# Patient Record
Sex: MALE | Race: WHITE | NOT HISPANIC OR LATINO | Employment: OTHER | URBAN - METROPOLITAN AREA
[De-identification: names, ages, dates, MRNs, and addresses within clinical notes are randomized per-mention and may not be internally consistent; named-entity substitution may affect disease eponyms.]

---

## 2017-02-12 ENCOUNTER — COMMUNICATION - HEALTHEAST (OUTPATIENT)
Dept: INTERNAL MEDICINE | Facility: CLINIC | Age: 57
End: 2017-02-12

## 2017-02-12 DIAGNOSIS — I10 HTN (HYPERTENSION): ICD-10-CM

## 2017-04-24 ENCOUNTER — OFFICE VISIT - HEALTHEAST (OUTPATIENT)
Dept: INTERNAL MEDICINE | Facility: CLINIC | Age: 57
End: 2017-04-24

## 2017-04-24 DIAGNOSIS — E78.2 MIXED HYPERLIPIDEMIA: ICD-10-CM

## 2017-04-24 DIAGNOSIS — E55.9 VITAMIN D INSUFFICIENCY: ICD-10-CM

## 2017-04-24 DIAGNOSIS — R53.83 FATIGUE: ICD-10-CM

## 2017-04-24 DIAGNOSIS — I10 ESSENTIAL HYPERTENSION, BENIGN: ICD-10-CM

## 2017-04-24 DIAGNOSIS — R73.09 OTHER ABNORMAL GLUCOSE: ICD-10-CM

## 2017-04-24 DIAGNOSIS — Z00.00 ROUTINE GENERAL MEDICAL EXAMINATION AT A HEALTH CARE FACILITY: ICD-10-CM

## 2017-04-24 DIAGNOSIS — R22.41 LEG MASS, RIGHT: ICD-10-CM

## 2017-04-24 DIAGNOSIS — I10 ESSENTIAL HYPERTENSION: ICD-10-CM

## 2017-04-24 DIAGNOSIS — C61 MALIGNANT NEOPLASM OF PROSTATE (H): ICD-10-CM

## 2017-04-24 DIAGNOSIS — G47.33 OBSTRUCTIVE SLEEP APNEA (ADULT) (PEDIATRIC): ICD-10-CM

## 2017-04-24 LAB
CHOLEST SERPL-MCNC: 194 MG/DL
FASTING STATUS PATIENT QL REPORTED: ABNORMAL
HDLC SERPL-MCNC: 40 MG/DL
LDLC SERPL CALC-MCNC: 112 MG/DL
PSA SERPL-MCNC: <0.1 NG/ML (ref 0–3.5)
TRIGL SERPL-MCNC: 209 MG/DL

## 2017-04-24 ASSESSMENT — MIFFLIN-ST. JEOR: SCORE: 1778.54

## 2017-04-25 ENCOUNTER — HOSPITAL ENCOUNTER (OUTPATIENT)
Dept: ULTRASOUND IMAGING | Facility: CLINIC | Age: 57
Discharge: HOME | End: 2017-04-25
Attending: INTERNAL MEDICINE

## 2017-04-25 DIAGNOSIS — R22.41 LEG MASS, RIGHT: ICD-10-CM

## 2017-04-26 ENCOUNTER — COMMUNICATION - HEALTHEAST (OUTPATIENT)
Dept: INTERNAL MEDICINE | Facility: CLINIC | Age: 57
End: 2017-04-26

## 2017-06-14 ENCOUNTER — COMMUNICATION - HEALTHEAST (OUTPATIENT)
Dept: INTERNAL MEDICINE | Facility: CLINIC | Age: 57
End: 2017-06-14

## 2017-06-14 ENCOUNTER — RECORDS - HEALTHEAST (OUTPATIENT)
Dept: ADMINISTRATIVE | Facility: OTHER | Age: 57
End: 2017-06-14

## 2018-02-01 ENCOUNTER — COMMUNICATION - HEALTHEAST (OUTPATIENT)
Dept: INTERNAL MEDICINE | Facility: CLINIC | Age: 58
End: 2018-02-01

## 2018-02-01 DIAGNOSIS — I10 ESSENTIAL HYPERTENSION: ICD-10-CM

## 2018-03-26 ENCOUNTER — RECORDS - HEALTHEAST (OUTPATIENT)
Dept: ADMINISTRATIVE | Facility: OTHER | Age: 58
End: 2018-03-26

## 2018-04-27 ENCOUNTER — COMMUNICATION - HEALTHEAST (OUTPATIENT)
Dept: INTERNAL MEDICINE | Facility: CLINIC | Age: 58
End: 2018-04-27

## 2018-04-30 ENCOUNTER — OFFICE VISIT - HEALTHEAST (OUTPATIENT)
Dept: INTERNAL MEDICINE | Facility: CLINIC | Age: 58
End: 2018-04-30

## 2018-04-30 DIAGNOSIS — E78.2 MIXED HYPERLIPIDEMIA: ICD-10-CM

## 2018-04-30 DIAGNOSIS — Z00.00 ROUTINE GENERAL MEDICAL EXAMINATION AT A HEALTH CARE FACILITY: ICD-10-CM

## 2018-04-30 DIAGNOSIS — I10 ESSENTIAL HYPERTENSION, BENIGN: ICD-10-CM

## 2018-04-30 DIAGNOSIS — C61 MALIGNANT NEOPLASM OF PROSTATE (H): ICD-10-CM

## 2018-04-30 DIAGNOSIS — F52.8 PSYCHOSEXUAL DYSFUNCTION WITH INHIBITED SEXUAL EXCITEMENT: ICD-10-CM

## 2018-04-30 DIAGNOSIS — G47.33 OBSTRUCTIVE SLEEP APNEA: ICD-10-CM

## 2018-04-30 LAB
ALBUMIN SERPL-MCNC: 4.3 G/DL (ref 3.5–5)
ALBUMIN UR-MCNC: NEGATIVE MG/DL
ALP SERPL-CCNC: 48 U/L (ref 45–120)
ALT SERPL W P-5'-P-CCNC: 43 U/L (ref 0–45)
ANION GAP SERPL CALCULATED.3IONS-SCNC: 12 MMOL/L (ref 5–18)
APPEARANCE UR: CLEAR
AST SERPL W P-5'-P-CCNC: 18 U/L (ref 0–40)
BILIRUB SERPL-MCNC: 1.5 MG/DL (ref 0–1)
BILIRUB UR QL STRIP: NEGATIVE
BUN SERPL-MCNC: 14 MG/DL (ref 8–22)
CALCIUM SERPL-MCNC: 9.9 MG/DL (ref 8.5–10.5)
CHLORIDE BLD-SCNC: 106 MMOL/L (ref 98–107)
CHOLEST SERPL-MCNC: 187 MG/DL
CO2 SERPL-SCNC: 22 MMOL/L (ref 22–31)
COLOR UR AUTO: YELLOW
CREAT SERPL-MCNC: 0.79 MG/DL (ref 0.7–1.3)
ERYTHROCYTE [DISTWIDTH] IN BLOOD BY AUTOMATED COUNT: 12.8 % (ref 11–14.5)
FASTING STATUS PATIENT QL REPORTED: YES
GFR SERPL CREATININE-BSD FRML MDRD: >60 ML/MIN/1.73M2
GLUCOSE BLD-MCNC: 91 MG/DL (ref 70–125)
GLUCOSE UR STRIP-MCNC: NEGATIVE MG/DL
HCT VFR BLD AUTO: 47.7 % (ref 40–54)
HDLC SERPL-MCNC: 40 MG/DL
HGB BLD-MCNC: 16.1 G/DL (ref 14–18)
HGB UR QL STRIP: NEGATIVE
KETONES UR STRIP-MCNC: NEGATIVE MG/DL
LDLC SERPL CALC-MCNC: 117 MG/DL
LEUKOCYTE ESTERASE UR QL STRIP: NEGATIVE
MCH RBC QN AUTO: 31.2 PG (ref 27–34)
MCHC RBC AUTO-ENTMCNC: 33.8 G/DL (ref 32–36)
MCV RBC AUTO: 92 FL (ref 80–100)
NITRATE UR QL: NEGATIVE
PH UR STRIP: 5 [PH] (ref 5–8)
PLATELET # BLD AUTO: 192 THOU/UL (ref 140–440)
PMV BLD AUTO: 7.4 FL (ref 7–10)
POTASSIUM BLD-SCNC: 3.5 MMOL/L (ref 3.5–5)
PROT SERPL-MCNC: 7.7 G/DL (ref 6–8)
PSA SERPL-MCNC: <0.1 NG/ML (ref 0–3.5)
RBC # BLD AUTO: 5.17 MILL/UL (ref 4.4–6.2)
SODIUM SERPL-SCNC: 140 MMOL/L (ref 136–145)
SP GR UR STRIP: 1.01 (ref 1–1.03)
TRIGL SERPL-MCNC: 150 MG/DL
UROBILINOGEN UR STRIP-ACNC: NORMAL
WBC: 6.9 THOU/UL (ref 4–11)

## 2018-04-30 ASSESSMENT — MIFFLIN-ST. JEOR: SCORE: 1792.15

## 2018-05-02 ENCOUNTER — COMMUNICATION - HEALTHEAST (OUTPATIENT)
Dept: INTERNAL MEDICINE | Facility: CLINIC | Age: 58
End: 2018-05-02

## 2018-05-24 ENCOUNTER — COMMUNICATION - HEALTHEAST (OUTPATIENT)
Dept: INTERNAL MEDICINE | Facility: CLINIC | Age: 58
End: 2018-05-24

## 2018-05-24 DIAGNOSIS — N52.9 ED (ERECTILE DYSFUNCTION): ICD-10-CM

## 2018-05-24 DIAGNOSIS — I10 ESSENTIAL HYPERTENSION: ICD-10-CM

## 2019-02-25 ENCOUNTER — RECORDS - HEALTHEAST (OUTPATIENT)
Dept: ADMINISTRATIVE | Facility: OTHER | Age: 59
End: 2019-02-25

## 2019-02-25 ENCOUNTER — OFFICE VISIT - HEALTHEAST (OUTPATIENT)
Dept: INTERNAL MEDICINE | Facility: CLINIC | Age: 59
End: 2019-02-25

## 2019-02-25 DIAGNOSIS — I10 ESSENTIAL HYPERTENSION, BENIGN: ICD-10-CM

## 2019-02-25 DIAGNOSIS — C61 MALIGNANT NEOPLASM OF PROSTATE (H): ICD-10-CM

## 2019-02-25 DIAGNOSIS — Z76.89 SLEEP CONCERN: ICD-10-CM

## 2019-02-25 DIAGNOSIS — Z00.00 ROUTINE GENERAL MEDICAL EXAMINATION AT A HEALTH CARE FACILITY: ICD-10-CM

## 2019-02-25 DIAGNOSIS — G47.33 OBSTRUCTIVE SLEEP APNEA: ICD-10-CM

## 2019-02-25 DIAGNOSIS — E78.2 MIXED HYPERLIPIDEMIA: ICD-10-CM

## 2019-02-25 DIAGNOSIS — R73.09 OTHER ABNORMAL GLUCOSE: ICD-10-CM

## 2019-02-25 LAB
ALBUMIN SERPL-MCNC: 4.6 G/DL (ref 3.5–5)
ALBUMIN UR-MCNC: ABNORMAL MG/DL
ALP SERPL-CCNC: 50 U/L (ref 45–120)
ALT SERPL W P-5'-P-CCNC: 37 U/L (ref 0–45)
ANION GAP SERPL CALCULATED.3IONS-SCNC: 11 MMOL/L (ref 5–18)
APPEARANCE UR: CLEAR
AST SERPL W P-5'-P-CCNC: 24 U/L (ref 0–40)
BACTERIA #/AREA URNS HPF: ABNORMAL HPF
BILIRUB SERPL-MCNC: 1.6 MG/DL (ref 0–1)
BILIRUB UR QL STRIP: NEGATIVE
BUN SERPL-MCNC: 15 MG/DL (ref 8–22)
CALCIUM SERPL-MCNC: 9.9 MG/DL (ref 8.5–10.5)
CHLORIDE BLD-SCNC: 106 MMOL/L (ref 98–107)
CHOLEST SERPL-MCNC: 203 MG/DL
CO2 SERPL-SCNC: 24 MMOL/L (ref 22–31)
COLOR UR AUTO: YELLOW
CREAT SERPL-MCNC: 0.95 MG/DL (ref 0.7–1.3)
ERYTHROCYTE [DISTWIDTH] IN BLOOD BY AUTOMATED COUNT: 13.1 % (ref 11–14.5)
FASTING STATUS PATIENT QL REPORTED: ABNORMAL
GFR SERPL CREATININE-BSD FRML MDRD: >60 ML/MIN/1.73M2
GLUCOSE BLD-MCNC: 86 MG/DL (ref 70–125)
GLUCOSE UR STRIP-MCNC: NEGATIVE MG/DL
HCT VFR BLD AUTO: 45.5 % (ref 40–54)
HDLC SERPL-MCNC: 47 MG/DL
HGB BLD-MCNC: 15.8 G/DL (ref 14–18)
HGB UR QL STRIP: NEGATIVE
HYALINE CASTS #/AREA URNS LPF: ABNORMAL LPF
KETONES UR STRIP-MCNC: NEGATIVE MG/DL
LDLC SERPL CALC-MCNC: 113 MG/DL
LEUKOCYTE ESTERASE UR QL STRIP: NEGATIVE
MCH RBC QN AUTO: 31.2 PG (ref 27–34)
MCHC RBC AUTO-ENTMCNC: 34.8 G/DL (ref 32–36)
MCV RBC AUTO: 90 FL (ref 80–100)
MUCOUS THREADS #/AREA URNS LPF: ABNORMAL LPF
NITRATE UR QL: NEGATIVE
PH UR STRIP: 5 [PH] (ref 5–8)
PLATELET # BLD AUTO: 194 THOU/UL (ref 140–440)
PMV BLD AUTO: 7.4 FL (ref 7–10)
POTASSIUM BLD-SCNC: 3.4 MMOL/L (ref 3.5–5)
PROT SERPL-MCNC: 7.8 G/DL (ref 6–8)
PSA SERPL-MCNC: <0.1 NG/ML (ref 0–3.5)
RBC # BLD AUTO: 5.08 MILL/UL (ref 4.4–6.2)
RBC #/AREA URNS AUTO: ABNORMAL HPF
SODIUM SERPL-SCNC: 141 MMOL/L (ref 136–145)
SP GR UR STRIP: >=1.03 (ref 1–1.03)
SQUAMOUS #/AREA URNS AUTO: ABNORMAL LPF
TRIGL SERPL-MCNC: 216 MG/DL
UROBILINOGEN UR STRIP-ACNC: ABNORMAL
WBC #/AREA URNS AUTO: ABNORMAL HPF
WBC: 8.3 THOU/UL (ref 4–11)

## 2019-02-25 ASSESSMENT — MIFFLIN-ST. JEOR: SCORE: 1774.01

## 2019-02-27 ENCOUNTER — COMMUNICATION - HEALTHEAST (OUTPATIENT)
Dept: INTERNAL MEDICINE | Facility: CLINIC | Age: 59
End: 2019-02-27

## 2019-02-27 DIAGNOSIS — E87.6 HYPOKALEMIA: ICD-10-CM

## 2019-02-28 ENCOUNTER — COMMUNICATION - HEALTHEAST (OUTPATIENT)
Dept: INTERNAL MEDICINE | Facility: CLINIC | Age: 59
End: 2019-02-28

## 2019-03-15 ENCOUNTER — COMMUNICATION - HEALTHEAST (OUTPATIENT)
Dept: INTERNAL MEDICINE | Facility: CLINIC | Age: 59
End: 2019-03-15

## 2019-04-02 ENCOUNTER — RECORDS - HEALTHEAST (OUTPATIENT)
Dept: ADMINISTRATIVE | Facility: OTHER | Age: 59
End: 2019-04-02

## 2019-06-04 ENCOUNTER — COMMUNICATION - HEALTHEAST (OUTPATIENT)
Dept: INTERNAL MEDICINE | Facility: CLINIC | Age: 59
End: 2019-06-04

## 2019-06-04 DIAGNOSIS — I10 ESSENTIAL HYPERTENSION: ICD-10-CM

## 2020-01-06 ENCOUNTER — COMMUNICATION - HEALTHEAST (OUTPATIENT)
Dept: SCHEDULING | Facility: CLINIC | Age: 60
End: 2020-01-06

## 2020-01-07 ENCOUNTER — COMMUNICATION - HEALTHEAST (OUTPATIENT)
Dept: INTERNAL MEDICINE | Facility: CLINIC | Age: 60
End: 2020-01-07

## 2020-01-07 DIAGNOSIS — I10 ESSENTIAL HYPERTENSION, BENIGN: ICD-10-CM

## 2020-02-26 ENCOUNTER — OFFICE VISIT - HEALTHEAST (OUTPATIENT)
Dept: INTERNAL MEDICINE | Facility: CLINIC | Age: 60
End: 2020-02-26

## 2020-02-26 DIAGNOSIS — E55.9 VITAMIN D INSUFFICIENCY: ICD-10-CM

## 2020-02-26 DIAGNOSIS — Z00.00 ROUTINE GENERAL MEDICAL EXAMINATION AT A HEALTH CARE FACILITY: ICD-10-CM

## 2020-02-26 DIAGNOSIS — E87.6 HYPOKALEMIA: ICD-10-CM

## 2020-02-26 DIAGNOSIS — G47.33 OBSTRUCTIVE SLEEP APNEA: ICD-10-CM

## 2020-02-26 DIAGNOSIS — I10 ESSENTIAL HYPERTENSION, BENIGN: ICD-10-CM

## 2020-02-26 DIAGNOSIS — R80.9 PROTEINURIA, UNSPECIFIED TYPE: ICD-10-CM

## 2020-02-26 DIAGNOSIS — R73.09 OTHER ABNORMAL GLUCOSE: ICD-10-CM

## 2020-02-26 DIAGNOSIS — C61 MALIGNANT NEOPLASM OF PROSTATE (H): ICD-10-CM

## 2020-02-26 DIAGNOSIS — E78.2 MIXED HYPERLIPIDEMIA: ICD-10-CM

## 2020-02-26 LAB
ALBUMIN SERPL-MCNC: 4.6 G/DL (ref 3.5–5)
ALBUMIN UR-MCNC: NEGATIVE MG/DL
ALP SERPL-CCNC: 46 U/L (ref 45–120)
ALT SERPL W P-5'-P-CCNC: 35 U/L (ref 0–45)
ANION GAP SERPL CALCULATED.3IONS-SCNC: 11 MMOL/L (ref 5–18)
APPEARANCE UR: CLEAR
AST SERPL W P-5'-P-CCNC: 24 U/L (ref 0–40)
BILIRUB SERPL-MCNC: 1.7 MG/DL (ref 0–1)
BILIRUB UR QL STRIP: NEGATIVE
BUN SERPL-MCNC: 15 MG/DL (ref 8–22)
CALCIUM SERPL-MCNC: 10.1 MG/DL (ref 8.5–10.5)
CHLORIDE BLD-SCNC: 104 MMOL/L (ref 98–107)
CHOLEST SERPL-MCNC: 210 MG/DL
CO2 SERPL-SCNC: 27 MMOL/L (ref 22–31)
COLOR UR AUTO: YELLOW
CREAT SERPL-MCNC: 0.91 MG/DL (ref 0.7–1.3)
ERYTHROCYTE [DISTWIDTH] IN BLOOD BY AUTOMATED COUNT: 12.3 % (ref 11–14.5)
FASTING STATUS PATIENT QL REPORTED: YES
GFR SERPL CREATININE-BSD FRML MDRD: >60 ML/MIN/1.73M2
GLUCOSE BLD-MCNC: 107 MG/DL (ref 70–125)
GLUCOSE UR STRIP-MCNC: NEGATIVE MG/DL
HCT VFR BLD AUTO: 47.4 % (ref 40–54)
HDLC SERPL-MCNC: 43 MG/DL
HGB BLD-MCNC: 16.4 G/DL (ref 14–18)
HGB UR QL STRIP: NEGATIVE
KETONES UR STRIP-MCNC: NEGATIVE MG/DL
LDLC SERPL CALC-MCNC: 113 MG/DL
LEUKOCYTE ESTERASE UR QL STRIP: NEGATIVE
MCH RBC QN AUTO: 31.8 PG (ref 27–34)
MCHC RBC AUTO-ENTMCNC: 34.6 G/DL (ref 32–36)
MCV RBC AUTO: 92 FL (ref 80–100)
NITRATE UR QL: NEGATIVE
PH UR STRIP: 5.5 [PH] (ref 5–8)
PLATELET # BLD AUTO: 201 THOU/UL (ref 140–440)
PMV BLD AUTO: 7.3 FL (ref 7–10)
POTASSIUM BLD-SCNC: 3.8 MMOL/L (ref 3.5–5)
PROT SERPL-MCNC: 7.8 G/DL (ref 6–8)
PSA SERPL-MCNC: <0.1 NG/ML (ref 0–4.5)
RBC # BLD AUTO: 5.16 MILL/UL (ref 4.4–6.2)
SODIUM SERPL-SCNC: 142 MMOL/L (ref 136–145)
SP GR UR STRIP: 1.02 (ref 1–1.03)
TRIGL SERPL-MCNC: 271 MG/DL
UROBILINOGEN UR STRIP-ACNC: NORMAL
WBC: 7.7 THOU/UL (ref 4–11)

## 2020-02-26 ASSESSMENT — MIFFLIN-ST. JEOR: SCORE: 1796.69

## 2020-02-27 ENCOUNTER — COMMUNICATION - HEALTHEAST (OUTPATIENT)
Dept: INTERNAL MEDICINE | Facility: CLINIC | Age: 60
End: 2020-02-27

## 2020-03-18 ENCOUNTER — COMMUNICATION - HEALTHEAST (OUTPATIENT)
Dept: INTERNAL MEDICINE | Facility: CLINIC | Age: 60
End: 2020-03-18

## 2020-03-18 DIAGNOSIS — I10 ESSENTIAL HYPERTENSION: ICD-10-CM

## 2020-04-29 ENCOUNTER — COMMUNICATION - HEALTHEAST (OUTPATIENT)
Dept: INTERNAL MEDICINE | Facility: CLINIC | Age: 60
End: 2020-04-29

## 2020-04-29 DIAGNOSIS — I10 ESSENTIAL HYPERTENSION, BENIGN: ICD-10-CM

## 2020-11-02 ENCOUNTER — COMMUNICATION - HEALTHEAST (OUTPATIENT)
Dept: INTERNAL MEDICINE | Facility: CLINIC | Age: 60
End: 2020-11-02

## 2020-11-02 DIAGNOSIS — I10 ESSENTIAL HYPERTENSION, BENIGN: ICD-10-CM

## 2021-02-01 ENCOUNTER — COMMUNICATION - HEALTHEAST (OUTPATIENT)
Dept: INTERNAL MEDICINE | Facility: CLINIC | Age: 61
End: 2021-02-01

## 2021-02-01 DIAGNOSIS — I10 ESSENTIAL HYPERTENSION, BENIGN: ICD-10-CM

## 2021-02-02 ENCOUNTER — OFFICE VISIT - HEALTHEAST (OUTPATIENT)
Dept: INTERNAL MEDICINE | Facility: CLINIC | Age: 61
End: 2021-02-02

## 2021-02-02 DIAGNOSIS — C61 MALIGNANT NEOPLASM OF PROSTATE (H): ICD-10-CM

## 2021-02-02 DIAGNOSIS — E78.2 MIXED HYPERLIPIDEMIA: ICD-10-CM

## 2021-02-02 DIAGNOSIS — G47.33 OBSTRUCTIVE SLEEP APNEA: ICD-10-CM

## 2021-02-02 DIAGNOSIS — F52.8 PSYCHOSEXUAL DYSFUNCTION WITH INHIBITED SEXUAL EXCITEMENT: ICD-10-CM

## 2021-02-02 DIAGNOSIS — I10 ESSENTIAL HYPERTENSION, BENIGN: ICD-10-CM

## 2021-02-02 DIAGNOSIS — R73.09 OTHER ABNORMAL GLUCOSE: ICD-10-CM

## 2021-02-02 DIAGNOSIS — Z00.00 ROUTINE GENERAL MEDICAL EXAMINATION AT A HEALTH CARE FACILITY: ICD-10-CM

## 2021-02-02 DIAGNOSIS — G47.52 REM SLEEP BEHAVIOR DISORDER: ICD-10-CM

## 2021-02-02 LAB
ALBUMIN SERPL-MCNC: 4.5 G/DL (ref 3.5–5)
ALBUMIN UR-MCNC: NEGATIVE MG/DL
ALP SERPL-CCNC: 41 U/L (ref 45–120)
ALT SERPL W P-5'-P-CCNC: 25 U/L (ref 0–45)
ANION GAP SERPL CALCULATED.3IONS-SCNC: 12 MMOL/L (ref 5–18)
APPEARANCE UR: CLEAR
AST SERPL W P-5'-P-CCNC: 19 U/L (ref 0–40)
BACTERIA #/AREA URNS HPF: ABNORMAL HPF
BILIRUB SERPL-MCNC: 1.9 MG/DL (ref 0–1)
BILIRUB UR QL STRIP: NEGATIVE
BUN SERPL-MCNC: 13 MG/DL (ref 8–22)
CALCIUM SERPL-MCNC: 9.5 MG/DL (ref 8.5–10.5)
CHLORIDE BLD-SCNC: 104 MMOL/L (ref 98–107)
CHOLEST SERPL-MCNC: 201 MG/DL
CO2 SERPL-SCNC: 24 MMOL/L (ref 22–31)
COLOR UR AUTO: YELLOW
CREAT SERPL-MCNC: 0.93 MG/DL (ref 0.7–1.3)
ERYTHROCYTE [DISTWIDTH] IN BLOOD BY AUTOMATED COUNT: 12.3 % (ref 11–14.5)
FASTING STATUS PATIENT QL REPORTED: YES
GFR SERPL CREATININE-BSD FRML MDRD: >60 ML/MIN/1.73M2
GLUCOSE BLD-MCNC: 97 MG/DL (ref 70–125)
GLUCOSE UR STRIP-MCNC: NEGATIVE MG/DL
HBA1C MFR BLD: 5.3 %
HCT VFR BLD AUTO: 41.9 % (ref 40–54)
HDLC SERPL-MCNC: 43 MG/DL
HGB BLD-MCNC: 14.6 G/DL (ref 14–18)
HGB UR QL STRIP: ABNORMAL
KETONES UR STRIP-MCNC: NEGATIVE MG/DL
LDLC SERPL CALC-MCNC: 133 MG/DL
LEUKOCYTE ESTERASE UR QL STRIP: NEGATIVE
MCH RBC QN AUTO: 30.4 PG (ref 27–34)
MCHC RBC AUTO-ENTMCNC: 34.8 G/DL (ref 32–36)
MCV RBC AUTO: 87 FL (ref 80–100)
NITRATE UR QL: NEGATIVE
PH UR STRIP: 5.5 [PH] (ref 5–8)
PLATELET # BLD AUTO: 174 THOU/UL (ref 140–440)
PMV BLD AUTO: 9 FL (ref 7–10)
POTASSIUM BLD-SCNC: 3.5 MMOL/L (ref 3.5–5)
PROT SERPL-MCNC: 7.2 G/DL (ref 6–8)
PSA SERPL-MCNC: <0.1 NG/ML (ref 0–4.5)
RBC # BLD AUTO: 4.8 MILL/UL (ref 4.4–6.2)
RBC #/AREA URNS AUTO: ABNORMAL HPF
SODIUM SERPL-SCNC: 140 MMOL/L (ref 136–145)
SP GR UR STRIP: >=1.03 (ref 1–1.03)
SQUAMOUS #/AREA URNS AUTO: ABNORMAL LPF
TRIGL SERPL-MCNC: 125 MG/DL
UROBILINOGEN UR STRIP-ACNC: ABNORMAL
WBC #/AREA URNS AUTO: ABNORMAL HPF
WBC: 5.5 THOU/UL (ref 4–11)

## 2021-02-02 ASSESSMENT — MIFFLIN-ST. JEOR: SCORE: 1764.93

## 2021-02-04 ENCOUNTER — COMMUNICATION - HEALTHEAST (OUTPATIENT)
Dept: INTERNAL MEDICINE | Facility: CLINIC | Age: 61
End: 2021-02-04

## 2021-05-29 NOTE — TELEPHONE ENCOUNTER
Refill Approved    Rx renewed per Medication Renewal Policy. Medication was last renewed on 5/24/18    Kalie Alvarez, [facility] Triage/Med Refill 6/5/2019     Requested Prescriptions   Pending Prescriptions Disp Refills     losartan-hydrochlorothiazide (HYZAAR) 100-12.5 mg per tablet [Pharmacy Med Name: LOSARTAN POT-HCTZ 100-12.5 TABS] 90 tablet 3     Sig: TAKE ONE TABLET BY MOUTH EVERY DAY       Diuretics/Combination Diuretics Refill Protocol  Passed - 6/4/2019  2:38 PM        Passed - Visit with PCP or prescribing provider visit in past 12 months     Last office visit with prescriber/PCP: 6/16/2016 Joseluis Chappell MD OR same dept: Visit date not found OR same specialty: 6/16/2016 Joseluis Chappell MD  Last physical: 2/25/2019 Last MTM visit: Visit date not found   Next visit within 3 mo: Visit date not found  Next physical within 3 mo: Visit date not found  Prescriber OR PCP: Joseluis Chappell MD  Last diagnosis associated with med order: 1. Essential hypertension  - losartan-hydrochlorothiazide (HYZAAR) 100-12.5 mg per tablet [Pharmacy Med Name: LOSARTAN POT-HCTZ 100-12.5 TABS]; TAKE ONE TABLET BY MOUTH EVERY DAY  Dispense: 90 tablet; Refill: 3    If protocol passes may refill for 12 months if within 3 months of last provider visit (or a total of 15 months).             Passed - Serum Potassium in past 12 months      Lab Results   Component Value Date    Potassium 3.4 (L) 02/25/2019             Passed - Serum Sodium in past 12 months      Lab Results   Component Value Date    Sodium 141 02/25/2019             Passed - Blood pressure on file in past 12 months     BP Readings from Last 1 Encounters:   02/25/19 112/72             Passed - Serum Creatinine in past 12 months      Creatinine   Date Value Ref Range Status   02/25/2019 0.95 0.70 - 1.30 mg/dL Final

## 2021-05-30 ENCOUNTER — RECORDS - HEALTHEAST (OUTPATIENT)
Dept: ADMINISTRATIVE | Facility: CLINIC | Age: 61
End: 2021-05-30

## 2021-05-30 VITALS — HEIGHT: 72 IN | WEIGHT: 208 LBS | BODY MASS INDEX: 28.17 KG/M2

## 2021-06-01 ENCOUNTER — COMMUNICATION - HEALTHEAST (OUTPATIENT)
Dept: INTERNAL MEDICINE | Facility: CLINIC | Age: 61
End: 2021-06-01

## 2021-06-01 VITALS — HEIGHT: 72 IN | WEIGHT: 211 LBS | BODY MASS INDEX: 28.58 KG/M2

## 2021-06-01 DIAGNOSIS — I10 ESSENTIAL HYPERTENSION, BENIGN: ICD-10-CM

## 2021-06-02 VITALS — HEIGHT: 72 IN | BODY MASS INDEX: 28.04 KG/M2 | WEIGHT: 207 LBS

## 2021-06-04 VITALS
BODY MASS INDEX: 28.71 KG/M2 | DIASTOLIC BLOOD PRESSURE: 66 MMHG | HEIGHT: 72 IN | WEIGHT: 212 LBS | SYSTOLIC BLOOD PRESSURE: 122 MMHG | OXYGEN SATURATION: 97 % | HEART RATE: 54 BPM

## 2021-06-05 VITALS
WEIGHT: 205 LBS | HEIGHT: 72 IN | SYSTOLIC BLOOD PRESSURE: 130 MMHG | OXYGEN SATURATION: 98 % | TEMPERATURE: 98.6 F | BODY MASS INDEX: 27.77 KG/M2 | DIASTOLIC BLOOD PRESSURE: 82 MMHG | HEART RATE: 83 BPM

## 2021-06-05 NOTE — TELEPHONE ENCOUNTER
Drug Change Request  Who is calling?:  shelly salgado  What is the current medication?:  hyzaar  What alternative is being requested?: please send 2 rew rx for separate medications   Why the request to change?:  Back order  Requested Pharmacy?: [facility]  Okay to leave a detailed message?:  Yes

## 2021-06-05 NOTE — TELEPHONE ENCOUNTER
Left message for the patient relaying message below from Dr. Chappell regarding separate prescriptions that were requested by the pharmacy.  Asked that the patient call with any further questions.  Kristi SANCHEZ CMA/VINCENT....................8:07 AM

## 2021-06-05 NOTE — TELEPHONE ENCOUNTER
Dr. Chappell,  Raghu for separate refills as requested by the pharmacy due to the nationwide back order of Hyzaar?  Prescriptions have been set up for you to review.  Kristi SANCHEZ CMA/VINCENT....................7:42 AM

## 2021-06-06 NOTE — TELEPHONE ENCOUNTER
----- Message from Joseluis Chappell MD sent at 2/26/2020  6:11 PM CST -----  Please call pt and send letter:    Abdi, your labs here look pretty good except for the cholesterol.  I think it is time we get you on something to prevent a heart attack or stroke.  Your 10-year risk of having a cardiac event is now over 10%.  I would recommend we start just a low dose of generic Crestor.  This is very well-tolerated.  Would you be willing to do that?    Shira let me know what he says.

## 2021-06-06 NOTE — TELEPHONE ENCOUNTER
"Patient Returning Call  Reason for call:  Patient returning phone call  Information relayed to patient:  Message below. After asking the patient would he be willing to start the Crestor he then stated that he wanted to talk to Dr. Chappell first \" so have him call me\"  Patient has additional questions:  Yes  If YES, what are your questions/concerns:  Want to speak to the dr directly  Okay to leave a detailed message?: Yes      "

## 2021-06-06 NOTE — PROGRESS NOTES
Office Visit - Physical   Nathanael Walker   59 y.o.  male    Date of visit: 2/26/2020  Physician: Joseluis Chappell MD     Assessment and Plan   1. Routine general medical examination at a health care facility  He lives a pretty healthy and active lifestyle.  Continue same.  Labs as below.  Urged yearly flu shots.  I have urged him to get his shingles vaccination through his pharmacy.  - Comprehensive Metabolic Panel  - Urinalysis-UC if Indicated  - Lipid Cascade  - HM2(CBC w/o Differential)  - PSA, Diagnostic (Prostatic-Specific Antigen)    2. Benign Essential Hypertension  Excellent control.  Continue same.    3. Hyperglycemia  Recheck blood sugar today.    4. Mixed hyperlipidemia  Lipids today for monitoring.  - Lipid Cascade    5. Adenocarcinoma Of The Prostate Gland  He is 10 years out from his prostate cancer surgery.  Check PSA diagnostic today.  - PSA, Diagnostic (Prostatic-Specific Antigen)    6. Obstructive sleep apnea  He is compliant with CPAP.  Return for to sleep medicine to get new supplies at [facility].  - HM2(CBC w/o Differential)  - Ambulatory referral to Sleep Medicine    7. Proteinuria, unspecified type    - Comprehensive Metabolic Panel  - Urinalysis-UC if Indicated    8. Vitamin D insufficiency  Continue with over-the-counter vitamin D supplementation.    9. Hypokalemia  He did not return for repeat potassium after last visit.  Recheck today.        Return in about 1 year (around 2/26/2021) for Annual physical.     Chief Complaint   Chief Complaint   Patient presents with     Annual Exam     fasting today         Patient Profile   Social History     Social History Narrative     Not on file        Past Medical History   Patient Active Problem List   Diagnosis     Male Erectile Disorder     Proteinuria     Adenocarcinoma Of The Prostate Gland     Mixed hyperlipidemia     Obstructive sleep apnea     Benign Essential Hypertension     Hyperglycemia     Vitamin D insufficiency       Past Surgical  History  He has no past surgical history on file.     History of Present Illness   This 59 y.o. old Jacek comes in a for physical.  Reports his been doing well.  Remains active with regular exercise.  Also he has been golfing down in [place].  He is planning a trip to the [place] his wife next week.  They will be down there for a couple weeks.  He is then going to go to the [employer] in April.  Looking forward to that.  Also [place].  No new family history.  He is estranged from one sister.  His other sister is doing well.  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  He does not know if he will continue to work beyond that.    Non-smoker and social drinker.    He refuses to get a flu shot.  He has not received the shingles vaccination yet.  He does not know if his insurance will cover it.  He has a high deductible plan.    He has sleep apnea and is very compliant with his CPAP.  He needs a new machine.  We set him up with sleep medicine last year but he did not go to that or set that up.  He did not want to spend the money.  He has a high deductible plan.    Review of Systems: A comprehensive review of systems was negative except as noted.     Medications and Allergies   Current Outpatient Medications   Medication Sig Dispense Refill     aspirin 81 MG EC tablet Take 81 mg by mouth daily.       cholecalciferol, vitamin D3, (VITAMIN D3) 2,000 unit cap Take by mouth.       hydroCHLOROthiazide (HYDRODIURIL) 12.5 MG tablet Take 1 tablet (12.5 mg total) by mouth daily. 90 tablet 1     losartan (COZAAR) 100 MG tablet Take 1 tablet (100 mg total) by mouth daily. 90 tablet 1     sildenafil, antihypertensive, (REVATIO) 20 mg tablet 1-5 tablets daily as needed 15 tablet 5     No current facility-administered medications for this visit.      No Known Allergies  "    Family and Social History   No family history on file.     Social History     Tobacco Use     Smoking status: Never Smoker     Smokeless tobacco: Never Used   Substance Use Topics     Alcohol use: Yes     Alcohol/week: 1.7 - 2.5 standard drinks     Types: 2 - 3 drink(s) per week     Drug use: No        Physical Exam   General Appearance:   Pleasant gentleman who appears younger than his age.    /66 (Patient Site: Right Arm, Patient Position: Sitting, Cuff Size: Adult Regular)   Pulse (!) 54   Ht 5' 11.5\" (1.816 m)   Wt 212 lb (96.2 kg)   SpO2 97%   BMI 29.16 kg/m      EYES: Eyelids, conjunctiva, and sclera were normal. Pupils were normal. Cornea, iris, and lens were normal bilaterally.  HEAD, EARS, NOSE, MOUTH, AND THROAT: Head and face were normal. Hearing was normal to voice and the ears were normal to external exam. Nose appearance was normal and there was no discharge. Oropharynx was normal.  NECK: Neck appearance was normal. There were no neck masses and the thyroid was not enlarged.  RESPIRATORY: Breathing pattern was normal and the chest moved symmetrically.  Percussion/auscultatory percussion was normal.  Lung sounds were normal and there were no abnormal sounds.  CARDIOVASCULAR: Heart rate and rhythm were normal.  S1 and S2 were normal and there were no extra sounds or murmurs. Peripheral pulses in arms and legs were normal.  Jugular venous pressure was normal.  There was no peripheral edema.  GASTROINTESTINAL: The abdomen was normal in contour.  Bowel sounds were present.  Percussion detected no organ enlargement or tenderness.  Palpation detected no tenderness, mass, or enlarged organs.   MUSCULOSKELETAL: Skeletal configuration was normal and muscle mass was normal for age. Joint appearance was overall normal.  LYMPHATIC: There were no enlarged nodes.  SKIN/HAIR/NAILS: Skin color was normal.  There were no skin lesions.  Hair and nails were normal.  NEUROLOGIC: The patient was alert and " oriented to person, place, time, and circumstance. Speech was normal. Cranial nerves were normal. Motor strength was normal for age. The patient was normally coordinated.  PSYCHIATRIC:  Mood and affect were normal and the patient had normal recent and remote memory. The patient's judgment and insight were normal.    ADDITIONAL VITAL SIGNS: None  CHEST WALL/BREASTS: nml    RECTAL: def  GENITAL/URINARY: nml penis and testes.      Additional Information        Joseluis Chappell MD  Internal Medicine  Contact me at [phone]

## 2021-06-06 NOTE — TELEPHONE ENCOUNTER
I called and left message on patient's voicemail to let me know when a good time to reach him would be.  He may be on vacation for the next couple weeks though from what I remember.  When he returns call please have him let me know when a good time to reach him would be.

## 2021-06-06 NOTE — TELEPHONE ENCOUNTER
LMTCB - please relay results/recommendations below per PCP. Please see if patient is willing to start medication and let PCP know, thank you.

## 2021-06-07 NOTE — TELEPHONE ENCOUNTER
RN cannot approve Refill Request    RN can NOT refill this medication medication not on med list.       Kalie Alvarez, [facility] Triage/Med Refill 3/19/2020    Requested Prescriptions   Pending Prescriptions Disp Refills     losartan-hydrochlorothiazide (HYZAAR) 100-12.5 mg per tablet [Pharmacy Med Name: LOSARTAN-HCTZ 100-12.5 TABS] 90 tablet 2     Sig: TAKE ONE TABLET BY MOUTH EVERY DAY       Diuretics/Combination Diuretics Refill Protocol  Passed - 3/18/2020  9:14 AM        Passed - Visit with PCP or prescribing provider visit in past 12 months     Last office visit with prescriber/PCP: 6/16/2016 Joseluis Chappell MD OR same dept: Visit date not found OR same specialty: 6/16/2016 Joseluis Chappell MD  Last physical: 2/26/2020 Last MTM visit: Visit date not found   Next visit within 3 mo: Visit date not found  Next physical within 3 mo: Visit date not found  Prescriber OR PCP: Joseluis Chappell MD  Last diagnosis associated with med order: 1. Essential hypertension  - losartan-hydrochlorothiazide (HYZAAR) 100-12.5 mg per tablet [Pharmacy Med Name: LOSARTAN-HCTZ 100-12.5 TABS]; TAKE ONE TABLET BY MOUTH EVERY DAY  Dispense: 90 tablet; Refill: 2    If protocol passes may refill for 12 months if within 3 months of last provider visit (or a total of 15 months).             Passed - Serum Potassium in past 12 months      Lab Results   Component Value Date    Potassium 3.8 02/26/2020             Passed - Serum Sodium in past 12 months      Lab Results   Component Value Date    Sodium 142 02/26/2020             Passed - Blood pressure on file in past 12 months     BP Readings from Last 1 Encounters:   02/26/20 122/66             Passed - Serum Creatinine in past 12 months      Creatinine   Date Value Ref Range Status   02/26/2020 0.91 0.70 - 1.30 mg/dL Final

## 2021-06-10 NOTE — PROGRESS NOTES
Office Visit - Physical   Nathanael Walker   56 y.o.  male    Date of visit: 4/24/2017  Physician: Joseluis Chappell MD     Assessment and Plan   1. Routine general medical examination at a health care facility  Patient is living a healthy lifestyle.  Increases aerobic exercise.  I counseled on a Mediterranean diet at length.  Labs as below.  Otherwise is up-to-date on health maintenance.  I counseled him on shingles vaccine.  I will hold off on that until he is 59.  We will have him see dermatology to evaluate and do a full body exam.  - Ambulatory referral to Dermatology  - Comprehensive Metabolic Panel  - PSA, Diagnostic (Prostatic-Specific Antigen)  - Lipid Cascade  - HM2(CBC w/o Differential)  - Urinalysis-UC if Indicated  - Vitamin D, Total (25-Hydroxy)    2. Fatigue  Labs as above.  - HM2(CBC w/o Differential)    3. Leg mass, right  Unclear as to etiology.  Possibly just due to previous injury but we discussed imaging.  I suggested MRI but he does have a very high deductible plan.  We will start with an ultrasound and see if they can give us any idea what it is.  - US Leg Non Vascular Right; Future    4. Adenocarcinoma Of The Prostate Gland  He has been disease-free for many years.  Check PSA  - PSA, Diagnostic (Prostatic-Specific Antigen)    5. Benign Essential Hypertension  Good control.  Continue regimen.  - Comprehensive Metabolic Panel  - Urinalysis-UC if Indicated    6. Hyperglycemia  Counseled on diet and lifestyle modification.    7. Mixed hyperlipidemia    - Lipid Cascade    8. Obstructive Sleep Apnea  Continue CPAP.    9. Vitamin D insufficiency    - Vitamin D, Total (25-Hydroxy)    10. Essential hypertension  Good control.  - losartan-hydrochlorothiazide (HYZAAR) 100-12.5 mg per tablet; TAKE 1 TABLET BY MOUTH EVERY DAY  Dispense: 90 tablet; Refill: 3    The following high BMI interventions were performed this visit: encouragement to exercise and prescribed diet education    Return in about 1 year  (around 4/24/2018) for Annual physical.     Chief Complaint   Chief Complaint   Patient presents with     Annual Exam     fasting        Patient Profile   Social History     Social History Narrative        Past Medical History   Patient Active Problem List   Diagnosis     Male Erectile Disorder     Proteinuria     Adenocarcinoma Of The Prostate Gland     Mixed hyperlipidemia     Obstructive Sleep Apnea     Benign Essential Hypertension     Hyperglycemia     Vitamin D insufficiency       Past Surgical History  He has no past surgical history on file.     History of Present Illness   This 56 y.o. old patient comes in today for physical.  Reports his been doing well.  Spending time up at his cabin on [place] in [place].  He notes he has been a little more tired than usual.  He is using his CPAP and his tries to get a full 7 hours asleep at night.  Does not keep the mask on the entire time.  Enjoys golfing and travel as well.  He just had another grandchild.  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  He also notes that he felt a lump or mass in his right leg.  Noted it within the last year.  Tender when he palpates on it.  It seems to be in the area where he had torn his hamstring many years ago.  No concerns.  He would like a refill of the Viagra if possible.  He does not always need it but it would be helpful for him he states.    Review of Systems: A comprehensive review of systems was negative except as noted.     Medications and Allergies   Current Outpatient Prescriptions   Medication Sig Dispense Refill     aspirin 81 MG EC tablet Take 81 mg by mouth daily.       cholecalciferol, vitamin D3, (VITAMIN D3) 2,000 unit cap Take by mouth.       losartan-hydrochlorothiazide (HYZAAR) 100-12.5 mg per tablet TAKE 1 TABLET BY MOUTH EVERY DAY 90 tablet 3     sildenafil (REVATIO) 20 mg tablet 1-5 tablets daily as needed 150 tablet 5     No current facility-administered medications for this visit.      No Known Allergies  "    Family and Social History   No family history on file.     Social History   Substance Use Topics     Smoking status: Never Smoker     Smokeless tobacco: Never Used     Alcohol use 1.0 - 1.5 oz/week     2 - 3 drink(s) per week        Physical Exam   General Appearance:   Pleasant gentleman who looks well and is in good spirits.  Looks younger than his age per    /62 (Patient Site: Right Arm, Patient Position: Sitting, Cuff Size: Adult Small)  Pulse (!) 54  Ht 5' 11.5\" (1.816 m)  Wt 208 lb (94.3 kg)  SpO2 97%  BMI 28.61 kg/m2    EYES: Eyelids, conjunctiva, and sclera were normal. Pupils were normal. Cornea, iris, and lens were normal bilaterally.  HEAD, EARS, NOSE, MOUTH, AND THROAT: Head and face were normal. Hearing was normal to voice and the ears were normal to external exam. Nose appearance was normal and there was no discharge. Oropharynx was normal.  NECK: Neck appearance was normal. There were no neck masses and the thyroid was not enlarged.  RESPIRATORY: Breathing pattern was normal and the chest moved symmetrically.  Percussion/auscultatory percussion was normal.  Lung sounds were normal and there were no abnormal sounds.  CARDIOVASCULAR: Heart rate and rhythm were normal.  S1 and S2 were normal and there were no extra sounds or murmurs. Peripheral pulses in arms and legs were normal.  Jugular venous pressure was normal.  There was no peripheral edema.  GASTROINTESTINAL: The abdomen was normal in contour.  Bowel sounds were present.  Percussion detected no organ enlargement or tenderness.  Palpation detected no tenderness, mass, or enlarged organs.   MUSCULOSKELETAL: Skeletal configuration was normal and muscle mass was normal for age. Joint appearance was overall normal.  Just superior to his knee on the lateral aspect of the distal femur there is a firm mass.  Mildly tender.  Probably a couple centimeters around.  LYMPHATIC: There were no enlarged nodes.  SKIN/HAIR/NAILS: Skin color was " normal.  A lot of freckling and sun damage.  He has a small very dark nevus left upper arm.  Hair and nails were normal.  NEUROLOGIC: The patient was alert and oriented to person, place, time, and circumstance. Speech was normal. Cranial nerves were normal. Motor strength was normal for age. The patient was normally coordinated.  PSYCHIATRIC:  Mood and affect were normal and the patient had normal recent and remote memory. The patient's judgment and insight were normal.    ADDITIONAL VITAL SIGNS: none  CHEST WALL/BREASTS: nml    RECTAL: def  GENITAL/URINARY: nml     Additional Information        Joseluis Chappell MD  Internal Medicine  Contact me at [phone]

## 2021-06-12 NOTE — TELEPHONE ENCOUNTER
Refill Approved    Rx renewed per Medication Renewal Policy. Medication was last renewed on 4/30/20.    Kalie Alvarez, [facility] Triage/Med Refill 11/5/2020     Requested Prescriptions   Pending Prescriptions Disp Refills     losartan (COZAAR) 50 MG tablet [Pharmacy Med Name: LOSARTAN POTASSIUM 50MG TABS] 180 tablet 1     Sig: TAKE TWO TABLETS BY MOUTH EVERY DAY       Angiotensin Receptor Blocker Protocol Passed - 11/2/2020  6:15 AM        Passed - PCP or prescribing provider visit in past 12 months       Last office visit with prescriber/PCP: Visit date not found OR same dept: Visit date not found OR same specialty: Visit date not found  Last physical: 2/26/2020 Last MTM visit: Visit date not found   Next visit within 3 mo: Visit date not found  Next physical within 3 mo: Visit date not found  Prescriber OR PCP: Joseluis Chappell MD  Last diagnosis associated with med order: 1. Benign Essential Hypertension  - losartan (COZAAR) 50 MG tablet [Pharmacy Med Name: LOSARTAN POTASSIUM 50MG TABS]; TAKE TWO TABLETS BY MOUTH EVERY DAY  Dispense: 180 tablet; Refill: 1  - hydroCHLOROthiazide (HYDRODIURIL) 12.5 MG tablet [Pharmacy Med Name: HYDROCHLOROTHIAZIDE 12.5MG TABS]; TAKE ONE TABLET BY MOUTH EVERY DAY  Dispense: 90 tablet; Refill: 1    If protocol passes may refill for 12 months if within 3 months of last provider visit (or a total of 15 months).             Passed - Serum potassium within the past 12 months     Lab Results   Component Value Date    Potassium 3.8 02/26/2020             Passed - Blood pressure filed in past 12 months     BP Readings from Last 1 Encounters:   10/20/20 (!) 200/107             Passed - Serum creatinine within the past 12 months     Creatinine   Date Value Ref Range Status   02/26/2020 0.91 0.70 - 1.30 mg/dL Final                hydroCHLOROthiazide (HYDRODIURIL) 12.5 MG tablet [Pharmacy Med Name: HYDROCHLOROTHIAZIDE 12.5MG TABS] 90 tablet 1     Sig: TAKE ONE TABLET BY MOUTH EVERY DAY        Diuretics/Combination Diuretics Refill Protocol  Passed - 11/2/2020  6:15 AM        Passed - Visit with PCP or prescribing provider visit in past 12 months     Last office visit with prescriber/PCP: Visit date not found OR same dept: Visit date not found OR same specialty: Visit date not found  Last physical: 2/26/2020 Last MTM visit: Visit date not found   Next visit within 3 mo: Visit date not found  Next physical within 3 mo: Visit date not found  Prescriber OR PCP: Joseluis Chappell MD  Last diagnosis associated with med order: 1. Benign Essential Hypertension  - losartan (COZAAR) 50 MG tablet [Pharmacy Med Name: LOSARTAN POTASSIUM 50MG TABS]; TAKE TWO TABLETS BY MOUTH EVERY DAY  Dispense: 180 tablet; Refill: 1  - hydroCHLOROthiazide (HYDRODIURIL) 12.5 MG tablet [Pharmacy Med Name: HYDROCHLOROTHIAZIDE 12.5MG TABS]; TAKE ONE TABLET BY MOUTH EVERY DAY  Dispense: 90 tablet; Refill: 1    If protocol passes may refill for 12 months if within 3 months of last provider visit (or a total of 15 months).             Passed - Serum Potassium in past 12 months      Lab Results   Component Value Date    Potassium 3.8 02/26/2020             Passed - Serum Sodium in past 12 months      Lab Results   Component Value Date    Sodium 142 02/26/2020             Passed - Blood pressure on file in past 12 months     BP Readings from Last 1 Encounters:   10/20/20 (!) 200/107             Passed - Serum Creatinine in past 12 months      Creatinine   Date Value Ref Range Status   02/26/2020 0.91 0.70 - 1.30 mg/dL Final

## 2021-06-14 NOTE — PROGRESS NOTES
Office Visit - Physical   Nathanael Walker   60 y.o.  male    Date of visit: 2/2/2021  Physician: Joseluis Chapplel MD     Assessment and Plan   1. Routine general medical examination at a health care facility  Patient lives an active and healthy lifestyle.  I counseled patient on statin for primary prevention.  We had an extensive discussion today.  He has been resistant to taking another medication.  He wants to wait until he gets his labs but I did discuss with him that I am likely going to mao recommended again this year.  - PSA, Diagnostic (Prostatic-Specific Antigen)  - Lipid Cascade FASTING  - Glycosylated Hemoglobin A1c  - Urinalysis-UC if Indicated  - Comprehensive Metabolic Panel  - HM2(CBC w/o Differential)    2. Mixed hyperlipidemia  As above.  - Lipid Cascade FASTING    3. Obstructive sleep apnea  He refuses to follow-up for sleep clinic.  Once he gets on Medicare we will readdress.  Continue current CPAP.  - HM2(CBC w/o Differential)    4. Benign Essential Hypertension  Good control.  Continue regimen.  - Urinalysis-UC if Indicated  - Comprehensive Metabolic Panel    5. Male Erectile Disorder  Controlled with as needed sildenafil.    6. Adenocarcinoma Of The Prostate Gland  Due for PSA.    7. Hyperglycemia    - Glycosylated Hemoglobin A1c    8. REM sleep behavior disorder  Trial of melatonin at bedtime.  6 to 12 mg at bedtime was discussed.    He has never had any issues with liver enzyme abnormalities except for mild elevation in bilirubin.  I am not sure if we need to do any further investigation at this point.    Return in about 1 year (around 2/2/2022) for Annual physical.     Chief Complaint   Chief Complaint   Patient presents with     Annual Exam     fasting today         Patient Profile   Social History     Social History Narrative     Not on file        Past Medical History   Patient Active Problem List   Diagnosis     Male Erectile Disorder     Proteinuria     Adenocarcinoma Of The Prostate  Gland     Mixed hyperlipidemia     Obstructive sleep apnea     Benign Essential Hypertension     Hyperglycemia     Vitamin D insufficiency     REM sleep behavior disorder       Past Surgical History  He has no past surgical history on file.     History of Present Illness   This 60 y.o. old Abdi comes in a for physical.  He reports things have been going fairly well.  He remains active with walking.  Wants to get back into running.  He has sleep apnea which he is using his old CPAP still.  I had referred him to get a new one last year but he did not want to spend the money as he has a high deductible plan.  He got a friend's machine and may start using that.  ED is well controlled.  He has fallen out of bed a few times with with need to go to emergency room.  He has a what sounds to be like of REM sleep disturbance where he has very profound dreams and sleep talks and flails around in bed.    [place]  [place]  [facility] [place].    His father  of non-Hodgkin's lymphoma he tells me.  His sister has PBC and his daughter apparently has been tested for PBC as well.    He refuses flu shot.  He is going to get a shingles vaccine from his pharmacy.    Review of Systems: A comprehensive review of systems was negative except as noted.     Medications and Allergies   Current Outpatient Medications   Medication Sig Dispense Refill     aspirin 81 MG EC tablet Take 81 mg by mouth daily.       cholecalciferol, vitamin D3, (VITAMIN D3) 2,000 unit cap Take by mouth.       hydroCHLOROthiazide (HYDRODIURIL) 12.5 MG tablet TAKE ONE TABLET BY MOUTH EVERY DAY 90 tablet 0     losartan (COZAAR) 50 MG tablet TAKE TWO TABLETS BY MOUTH EVERY  tablet 0     sildenafil, antihypertensive, (REVATIO) 20 mg tablet 1-5 tablets daily as needed 15 tablet 5     No current facility-administered medications for this visit.      No Known Allergies     Family and  "Social History   No family history on file.     Social History     Tobacco Use     Smoking status: Never Smoker     Smokeless tobacco: Never Used   Substance Use Topics     Alcohol use: Yes     Alcohol/week: 1.7 - 2.5 standard drinks     Types: 2 - 3 drink(s) per week     Drug use: No        Physical Exam   General Appearance:   Pleasant gentleman appears younger than his age.    /82 (Patient Site: Left Arm, Patient Position: Sitting, Cuff Size: Adult Regular)   Pulse 83   Temp 98.6  F (37  C)   Ht 5' 11.5\" (1.816 m)   Wt 205 lb (93 kg)   SpO2 98%   BMI 28.19 kg/m      EYES: Eyelids, conjunctiva, and sclera were normal. Pupils were normal. Cornea, iris, and lens were normal bilaterally.  HEAD, EARS, NOSE, MOUTH, AND THROAT: Head and face were normal. Hearing was normal to voice and the ears were normal to external exam. Nose appearance was normal and there was no discharge. Oropharynx was normal.  NECK: Neck appearance was normal. There were no neck masses and the thyroid was not enlarged.  RESPIRATORY: Breathing pattern was normal and the chest moved symmetrically.  Percussion/auscultatory percussion was normal.  Lung sounds were normal and there were no abnormal sounds.  CARDIOVASCULAR: Heart rate and rhythm were normal.  S1 and S2 were normal and there were no extra sounds or murmurs. Peripheral pulses in arms and legs were normal.  Jugular venous pressure was normal.  There was no peripheral edema.  GASTROINTESTINAL: The abdomen was normal in contour.  Bowel sounds were present.  Percussion detected no organ enlargement or tenderness.  Palpation detected no tenderness, mass, or enlarged organs.   MUSCULOSKELETAL: Skeletal configuration was normal and muscle mass was normal for age. Joint appearance was overall normal.  LYMPHATIC: There were no enlarged nodes.  SKIN/HAIR/NAILS: Skin color was normal.  There were no skin lesions.  Hair and nails were normal.  NEUROLOGIC: The patient was alert and " oriented to person, place, time, and circumstance. Speech was normal. Cranial nerves were normal. Motor strength was normal for age. The patient was normally coordinated.  PSYCHIATRIC:  Mood and affect were normal and the patient had normal recent and remote memory. The patient's judgment and insight were normal.    ADDITIONAL VITAL SIGNS: none  CHEST WALL/BREASTS: nml    RECTAL: def  GENITAL/URINARY: nml     Additional Information        Joseluis Chappell MD  Internal Medicine  Contact me at [phone]

## 2021-06-14 NOTE — TELEPHONE ENCOUNTER
Refill Approved    Rx renewed per Medication Renewal Policy. Medication was last renewed on 11/5/20.    Kalie Alvarez, [facility] Connection Triage/Med Refill 2/1/2021     Requested Prescriptions   Pending Prescriptions Disp Refills     hydroCHLOROthiazide (HYDRODIURIL) 12.5 MG tablet [Pharmacy Med Name: HYDROCHLOROTHIAZIDE 12.5MG TABS] 90 tablet 0     Sig: TAKE ONE TABLET BY MOUTH EVERY DAY       Diuretics/Combination Diuretics Refill Protocol  Passed - 2/1/2021  9:43 AM        Passed - Visit with PCP or prescribing provider visit in past 12 months     Last office visit with prescriber/PCP: Visit date not found OR same dept: Visit date not found OR same specialty: Visit date not found  Last physical: 2/26/2020 Last MTM visit: Visit date not found   Next visit within 3 mo: Visit date not found  Next physical within 3 mo: Visit date not found  Prescriber OR PCP: Joseluis Chappell MD  Last diagnosis associated with med order: 1. Benign Essential Hypertension  - hydroCHLOROthiazide (HYDRODIURIL) 12.5 MG tablet [Pharmacy Med Name: HYDROCHLOROTHIAZIDE 12.5MG TABS]; TAKE ONE TABLET BY MOUTH EVERY DAY  Dispense: 90 tablet; Refill: 0  - losartan (COZAAR) 50 MG tablet [Pharmacy Med Name: LOSARTAN POTASSIUM 50MG TABS]; TAKE TWO TABLETS BY MOUTH EVERY DAY  Dispense: 180 tablet; Refill: 0    If protocol passes may refill for 12 months if within 3 months of last provider visit (or a total of 15 months).             Passed - Serum Potassium in past 12 months      Lab Results   Component Value Date    Potassium 3.8 02/26/2020             Passed - Serum Sodium in past 12 months      Lab Results   Component Value Date    Sodium 142 02/26/2020             Passed - Blood pressure on file in past 12 months     BP Readings from Last 1 Encounters:   10/20/20 (!) 200/107             Passed - Serum Creatinine in past 12 months      Creatinine   Date Value Ref Range Status   02/26/2020 0.91 0.70 - 1.30 mg/dL Final                losartan (COZAAR) 50  MG tablet [Pharmacy Med Name: LOSARTAN POTASSIUM 50MG TABS] 180 tablet 0     Sig: TAKE TWO TABLETS BY MOUTH EVERY DAY       Angiotensin Receptor Blocker Protocol Passed - 2/1/2021  9:43 AM        Passed - PCP or prescribing provider visit in past 12 months       Last office visit with prescriber/PCP: Visit date not found OR same dept: Visit date not found OR same specialty: Visit date not found  Last physical: 2/26/2020 Last MTM visit: Visit date not found   Next visit within 3 mo: Visit date not found  Next physical within 3 mo: Visit date not found  Prescriber OR PCP: Joseluis Chappell MD  Last diagnosis associated with med order: 1. Benign Essential Hypertension  - hydroCHLOROthiazide (HYDRODIURIL) 12.5 MG tablet [Pharmacy Med Name: HYDROCHLOROTHIAZIDE 12.5MG TABS]; TAKE ONE TABLET BY MOUTH EVERY DAY  Dispense: 90 tablet; Refill: 0  - losartan (COZAAR) 50 MG tablet [Pharmacy Med Name: LOSARTAN POTASSIUM 50MG TABS]; TAKE TWO TABLETS BY MOUTH EVERY DAY  Dispense: 180 tablet; Refill: 0    If protocol passes may refill for 12 months if within 3 months of last provider visit (or a total of 15 months).             Passed - Serum potassium within the past 12 months     Lab Results   Component Value Date    Potassium 3.8 02/26/2020             Passed - Blood pressure filed in past 12 months     BP Readings from Last 1 Encounters:   10/20/20 (!) 200/107             Passed - Serum creatinine within the past 12 months     Creatinine   Date Value Ref Range Status   02/26/2020 0.91 0.70 - 1.30 mg/dL Final

## 2021-06-15 NOTE — TELEPHONE ENCOUNTER
FYI - Patient notified lab results/recommendations and verbalized understanding. He reports that he is not really interested at starting cholesterol medications at this time. He will wait for paper copy of lab results for review. He will return call to clinic if he does decide/want to start medication.

## 2021-06-15 NOTE — TELEPHONE ENCOUNTER
Joseluis Chappell MD   2/3/2021  6:43 PM CST      Please call pt and send letter:     PSA is undetectable as it should be.  Liver tests and kidney tests are normal.  People with PBC such as your sister have elevated alkaline phosphatase.  Yours is actually on the low side.  I do not think you have that issue.  Everything else here looks pretty good except your cholesterol is still too high for a man of your age.  Would you be willing to start that medication as we discussed?
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
## 2021-06-18 NOTE — LETTER
Letter by Joseluis Chappell MD at      Author: Joseluis Chappell MD Service: -- Author Type: --    Filed:  Encounter Date: 2/27/2019 Status: (Other)       Nathanael Walker  [address] [facility] [address]             February 27, 2019         Dear Mr. Walker,    Below are the results from your recent visit:    Resulted Orders   Lipid Cascade   Result Value Ref Range    Cholesterol 203 (H) <=199 mg/dL    Triglycerides 216 (H) <=149 mg/dL    HDL Cholesterol 47 >=40 mg/dL    LDL Calculated 113 <=129 mg/dL    Patient Fasting > 8hrs? Unknown    Comprehensive Metabolic Panel   Result Value Ref Range    Sodium 141 136 - 145 mmol/L    Potassium 3.4 (L) 3.5 - 5.0 mmol/L    Chloride 106 98 - 107 mmol/L    CO2 24 22 - 31 mmol/L    Anion Gap, Calculation 11 5 - 18 mmol/L    Glucose 86 70 - 125 mg/dL    BUN 15 8 - 22 mg/dL    Creatinine 0.95 0.70 - 1.30 mg/dL    GFR MDRD Af Amer >60 >60 mL/min/1.73m2    GFR MDRD Non Af Amer >60 >60 mL/min/1.73m2    Bilirubin, Total 1.6 (H) 0.0 - 1.0 mg/dL    Calcium 9.9 8.5 - 10.5 mg/dL    Protein, Total 7.8 6.0 - 8.0 g/dL    Albumin 4.6 3.5 - 5.0 g/dL    Alkaline Phosphatase 50 45 - 120 U/L    AST 24 0 - 40 U/L    ALT 37 0 - 45 U/L    Narrative    Fasting Glucose reference range is 70-99 mg/dL per  American Diabetes Association (ADA) guidelines.   PSA, Diagnostic (Prostatic-Specific Antigen)   Result Value Ref Range    PSA <0.1 0.0 - 3.5 ng/mL    Narrative    Method is Abbott Prostate-Specific Antigen (PSA)  Standard-WHO 1st International (90:10)   HM2(CBC w/o Differential)   Result Value Ref Range    WBC 8.3 4.0 - 11.0 thou/uL    RBC 5.08 4.40 - 6.20 mill/uL    Hemoglobin 15.8 14.0 - 18.0 g/dL    Hematocrit 45.5 40.0 - 54.0 %    MCV 90 80 - 100 fL    MCH 31.2 27.0 - 34.0 pg    MCHC 34.8 32.0 - 36.0 g/dL    RDW 13.1 11.0 - 14.5 %    Platelets 194 140 - 440 thou/uL    MPV 7.4 7.0 - 10.0 fL   Urinalysis-UC if Indicated   Result Value Ref Range    Color, UA Yellow Colorless, Yellow, Straw,  Light Yellow    Clarity, UA Clear Clear    Glucose, UA Negative Negative    Bilirubin, UA Negative Negative    Ketones, UA Negative Negative    Specific Gravity, UA >=1.030 1.005 - 1.030    Blood, UA Negative Negative    pH, UA 5.0 5.0 - 8.0    Protein, UA 30 mg/dL (!) Negative mg/dL    Urobilinogen, UA 0.2 E.U./dL 0.2 E.U./dL, 1.0 E.U./dL    Nitrite, UA Negative Negative    Leukocytes, UA Negative Negative    Bacteria, UA None Seen None Seen hpf    RBC, UA None Seen None Seen, 0-2 hpf    WBC, UA None Seen None Seen, 0-5 hpf    Squam Epithel, UA 0-5 None Seen, 0-5 lpf    Mucus, UA Few (!) None Seen lpf    Hyaline Casts, UA 0-5 0-5, None Seen lpf    Narrative    UC not indicated       PSA is undetectable again.  Cholesterol looks better than last year.  You are right on the border whether or not we should start a medication this year.  Considering that you plan to increase your exercise and work harder on diet and weight loss, I think that we can hold off for another year on the medication.  Your potassium is a little low.  I want you to increase the potassium in your diet.  You should have this repeated in a week or 2.  You can do that in [place].  Liver and kidney functions otherwise look fine.  Urine is fine.  I do not think the small amount of protein is significant.     Please call with questions or contact us using [employer].    Sincerely,        Electronically signed by Joseluis Chappell MD

## 2021-06-18 NOTE — LETTER
Letter by Joseluis Chappell MD at      Author: Josleuis Chappell MD Service: -- Author Type: --    Filed:  Encounter Date: 2/28/2019 Status: (Other)        [facility] INTERNAL MEDICINE  [address] [place] [address] [address]  [facility] [address]  [phone]         Nathanael Walker  [address] [facility] [address]        02/28/19    Dear Nathanael Walker,     At [facility] we care about your health and well-being. Your primary care provider is committed to ensuring you receive high quality care and has chosen a network of specialists to assist in providing that care. Recently Dr. Chappell referred you to Sleep Medicine for specialty care.       It is important to your overall health to follow through with the recommendation from your provider. Please call [facility] Sleep [phone] at your earliest convenience for assistance in scheduling an appointment.  If you have already scheduled this appointment, please disregard this notice.  Thank you for choosing [facility] for your healthcare needs.       Sincerely,       [facility] Specialty Scheduling

## 2021-06-20 NOTE — LETTER
Letter by Joseluis Chappell MD at      Author: Joseluis Chappell MD Service: -- Author Type: --    Filed:  Encounter Date: 2/27/2020 Status: (Other)         Nathanael Walker  [address] [facility] [address]             February 27, 2020         Dear Mr. Walker,    Below are the results from your recent visit:    Resulted Orders   Comprehensive Metabolic Panel   Result Value Ref Range    Sodium 142 136 - 145 mmol/L    Potassium 3.8 3.5 - 5.0 mmol/L    Chloride 104 98 - 107 mmol/L    CO2 27 22 - 31 mmol/L    Anion Gap, Calculation 11 5 - 18 mmol/L    Glucose 107 70 - 125 mg/dL    BUN 15 8 - 22 mg/dL    Creatinine 0.91 0.70 - 1.30 mg/dL    GFR MDRD Af Amer >60 >60 mL/min/1.73m2    GFR MDRD Non Af Amer >60 >60 mL/min/1.73m2    Bilirubin, Total 1.7 (H) 0.0 - 1.0 mg/dL    Calcium 10.1 8.5 - 10.5 mg/dL    Protein, Total 7.8 6.0 - 8.0 g/dL    Albumin 4.6 3.5 - 5.0 g/dL    Alkaline Phosphatase 46 45 - 120 U/L    AST 24 0 - 40 U/L    ALT 35 0 - 45 U/L    Narrative    Fasting Glucose reference range is 70-99 mg/dL per  American Diabetes Association (ADA) guidelines.   Urinalysis-UC if Indicated   Result Value Ref Range    Color, UA Yellow Colorless, Yellow, Straw, Light Yellow    Clarity, UA Clear Clear    Glucose, UA Negative Negative    Bilirubin, UA Negative Negative    Ketones, UA Negative Negative    Specific Gravity, UA 1.025 1.005 - 1.030    Blood, UA Negative Negative    pH, UA 5.5 5.0 - 8.0    Protein, UA Negative Negative mg/dL    Urobilinogen, UA 0.2 E.U./dL 0.2 E.U./dL, 1.0 E.U./dL    Nitrite, UA Negative Negative    Leukocytes, UA Negative Negative    Narrative    Microscopic not indicated  UC not indicated   Lipid Cascade   Result Value Ref Range    Cholesterol 210 (H) <=199 mg/dL    Triglycerides 271 (H) <=149 mg/dL    HDL Cholesterol 43 >=40 mg/dL    LDL Calculated 113 <=129 mg/dL    Patient Fasting > 8hrs? Yes    HM2(CBC w/o Differential)   Result Value Ref Range    WBC 7.7 4.0 - 11.0 thou/uL    RBC  5.16 4.40 - 6.20 mill/uL    Hemoglobin 16.4 14.0 - 18.0 g/dL    Hematocrit 47.4 40.0 - 54.0 %    MCV 92 80 - 100 fL    MCH 31.8 27.0 - 34.0 pg    MCHC 34.6 32.0 - 36.0 g/dL    RDW 12.3 11.0 - 14.5 %    Platelets 201 140 - 440 thou/uL    MPV 7.3 7.0 - 10.0 fL   PSA, Diagnostic (Prostatic-Specific Antigen)   Result Value Ref Range    PSA <0.1 0.0 - 4.5 ng/mL    Narrative    Method is Abbott Prostate-Specific Antigen (PSA)  Standard-WHO 1st International (90:10)       Abdi, your labs here look pretty good except for the cholesterol.  I think it is time we get you on something to prevent a heart attack or stroke.  Your 10-year risk of having a cardiac event is now over 10%.  I would recommend we start just a low dose of generic Crestor.  This is very well-tolerated.  Would you be willing to do that?     Please call with questions or contact us using [employer].    Sincerely,        Electronically signed by Joseluis Chappell MD

## 2021-06-21 NOTE — LETTER
Letter by Joseluis Chappell MD at      Author: Joseluis Chappell MD Service: -- Author Type: --    Filed:  Encounter Date: 2/4/2021 Status: (Other)         Nathanael Walker  [id number] [facility] [id number]             February 4, 2021         Dear Mr. Walker,    Below are the results from your recent visit:    Resulted Orders   PSA, Diagnostic (Prostatic-Specific Antigen)   Result Value Ref Range    PSA <0.1 0.0 - 4.5 ng/mL    Narrative    Method is Abbott Prostate-Specific Antigen (PSA)  Standard-WHO 1st International (90:10)   Lipid [place] FASTING   Result Value Ref Range    Cholesterol 201 (H) <=199 mg/dL    Triglycerides 125 <=149 mg/dL    HDL Cholesterol 43 >=40 mg/dL    LDL Calculated 133 (H) <=129 mg/dL    Patient Fasting > 8hrs? Yes    Glycosylated Hemoglobin A1c   Result Value Ref Range    Hemoglobin A1c 5.3 <=5.6 %   Urinalysis-UC if Indicated   Result Value Ref Range    Color, UA Yellow Colorless, Yellow, Straw, Light Yellow    Clarity, UA Clear Clear    Glucose, UA Negative Negative    Bilirubin, UA Negative Negative    Ketones, UA Negative Negative    Specific Gravity, UA >=1.030 1.005 - 1.030    Blood, UA Trace (!) Negative    pH, UA 5.5 5.0 - 8.0    Protein, UA Negative Negative mg/dL    Urobilinogen, UA 0.2 E.U./dL 0.2 E.U./dL, 1.0 E.U./dL    Nitrite, UA Negative Negative    Leukocytes, UA Negative Negative    Bacteria, UA None Seen None Seen hpf    RBC, UA 0-2 None Seen, 0-2 hpf    WBC, UA 0-5 None Seen, 0-5 hpf    Squam Epithel, UA 0-5 None Seen, 0-5 lpf    Narrative    UC not indicated   Comprehensive Metabolic Panel   Result Value Ref Range    Sodium 140 136 - 145 mmol/L    Potassium 3.5 3.5 - 5.0 mmol/L    Chloride 104 98 - 107 mmol/L    CO2 24 22 - 31 mmol/L    Anion Gap, Calculation 12 5 - 18 mmol/L    Glucose 97 70 - 125 mg/dL    BUN 13 8 - 22 mg/dL    Creatinine 0.93 0.70 - 1.30 mg/dL    GFR MDRD Af Amer >60 >60 mL/min/1.73m2    GFR MDRD Non Af Amer >60 >60 mL/min/1.73m2     Bilirubin, Total 1.9 (H) 0.0 - 1.0 mg/dL    Calcium 9.5 8.5 - 10.5 mg/dL    Protein, Total 7.2 6.0 - 8.0 g/dL    Albumin 4.5 3.5 - 5.0 g/dL    Alkaline Phosphatase 41 (L) 45 - 120 U/L    AST 19 0 - 40 U/L    ALT 25 0 - 45 U/L    Narrative    Fasting Glucose reference range is 70-99 mg/dL per  American Diabetes Association (ADA) guidelines.   HM2(CBC w/o Differential)   Result Value Ref Range    WBC 5.5 4.0 - 11.0 thou/uL    RBC 4.80 4.40 - 6.20 mill/uL    Hemoglobin 14.6 14.0 - 18.0 g/dL    Hematocrit 41.9 40.0 - 54.0 %    MCV 87 80 - 100 fL    MCH 30.4 27.0 - 34.0 pg    MCHC 34.8 32.0 - 36.0 g/dL    RDW 12.3 11.0 - 14.5 %    Platelets 174 140 - 440 thou/uL    MPV 9.0 7.0 - 10.0 fL       PSA is undetectable as it should be.  Liver tests and kidney tests are normal.  People with PBC such as your sister have elevated alkaline phosphatase.  Yours is actually on the low side.  I do not think you have that issue.  Everything else here looks pretty good except your cholesterol is still too high for a man of your age.  Would you be willing to start that medication as we discussed?     Please call with questions or contact us using [employer].    Sincerely,        Electronically signed by Joseluis Chappell MD

## 2021-06-24 NOTE — TELEPHONE ENCOUNTER
LMTCB - please relay results/recommendations below per PCP. Please let patient know that I have also sent out potassium diet info along with lab results to his home address. Once message is relayed please scheduled pt for a LAB APPT only to recheck in 2 weeks. If easier please schedule lab appt in [place].     I will place future lab orders for PCP to co-sign order, thanks.

## 2021-06-24 NOTE — TELEPHONE ENCOUNTER
Spoke with patient and relayed message below. He will schedule a lab appointment  Princess Murillo CSS

## 2021-06-24 NOTE — PROGRESS NOTES
Office Visit - Physical   Nathanael Walker   58 y.o.  male    Date of visit: 2/25/2019  Physician: Joseluis Chappell MD     Assessment and Plan   1. Routine general medical examination at a health care facility  Patient was a healthy lifestyle.  He will continue same.  Labs as below.  Urged yearly flu shot.  We discussed primary prevention for heart disease and he is been resistant to starting statin in the past.  He may reconsider.  - Ambulatory referral for Colonoscopy  - Lipid [place]  - Comprehensive Metabolic Panel  - PSA, Diagnostic (Prostatic-Specific Antigen)  - HM2(CBC w/o Differential)  - Urinalysis-UC if Indicated    2. Obstructive sleep apnea  Continue CPAP.  He is due for a new machine as his is broken.  - Ambulatory referral to Sleep Medicine    3. Sleep concern  He may benefit from a another sleep study.  - Ambulatory referral to Sleep Medicine    4. Adenocarcinoma Of The Prostate Gland  PSA will be done.    5. Benign Essential Hypertension  Excellent control.  Continue same.    6. Hyperglycemia  We will check sugar today.    7. Mixed hyperlipidemia  As above, he would likely be benefited by a statin but he is been resistant to that.  Will readdress once we get his labs back again.        Return in about 1 year (around 2/25/2020) for Annual physical.     Chief Complaint   Chief Complaint   Patient presents with     Annual Exam     Referral     sleep clinic, needs Cpap machine replaced, been 10 yrs        Patient Profile   Social History     Social History Narrative     Not on file        Past Medical History   Patient Active Problem List   Diagnosis     Male Erectile Disorder     Proteinuria     Adenocarcinoma Of The Prostate Gland     Mixed hyperlipidemia     Obstructive sleep apnea     Benign Essential Hypertension     Hyperglycemia     Vitamin D insufficiency       Past Surgical History  He has no past surgical history on file.     History of Present Illness   This 58 y.o. old pleasant patient comes  "in today for physical.  He has a history of prostate cancer treated many years ago surgery.  He is been disease free since then.  He also has history of hypertension.  Reports his been feeling well.  Denies chest pains or shortness of breath.  He will occasionally get some left arm discomfort but that is not associated with exertion.  He is active with a lot of running and elliptical and other cardiovascular activity.  He is due for colonoscopy.  He continues to have the sleep issue where he will flail.  His wife is concerned that this could be a precursor for dementia.  He denies any symptoms of dementia.  Kids and grandkids are well.  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  [place].    Review of Systems: A comprehensive review of systems was negative except as noted.     Medications and Allergies   Current Outpatient Medications   Medication Sig Dispense Refill     aspirin 81 MG EC tablet Take 81 mg by mouth daily.       cholecalciferol, vitamin D3, (VITAMIN D3) 2,000 unit cap Take by mouth.       losartan-hydrochlorothiazide (HYZAAR) 100-12.5 mg per tablet TAKE ONE TABLET BY MOUTH EVERY DAY 90 tablet 3     sildenafil, antihypertensive, (REVATIO) 20 mg tablet 1-5 tablets daily as needed 15 tablet 5     No current facility-administered medications for this visit.      No Known Allergies     Family and Social History   No family history on file.     Social History     Tobacco Use     Smoking status: Never Smoker     Smokeless tobacco: Never Used   Substance Use Topics     Alcohol use: Yes     Alcohol/week: 1.0 - 1.5 oz     Types: 2 - 3 drink(s) per week     Drug use: No        Physical Exam   General Appearance:   Pleasant gentleman in no distress.    /72 (Patient Site: Right Arm, Patient Position: Sitting, Cuff Size: Adult Large)   Pulse 61   Ht 5' 11.5\" (1.816 m)   Wt 207 lb (93.9 kg)   SpO2 97%   BMI 28.47 kg/m      EYES: Eyelids, " conjunctiva, and sclera were normal. Pupils were normal. Cornea, iris, and lens were normal bilaterally.  HEAD, EARS, NOSE, MOUTH, AND THROAT: Head and face were normal. Hearing was normal to voice and the ears were normal to external exam. Nose appearance was normal and there was no discharge. Oropharynx was normal.  NECK: Neck appearance was normal. There were no neck masses and the thyroid was not enlarged.  RESPIRATORY: Breathing pattern was normal and the chest moved symmetrically.  Percussion/auscultatory percussion was normal.  Lung sounds were normal and there were no abnormal sounds.  CARDIOVASCULAR: Heart rate and rhythm were normal.  S1 and S2 were normal and there were no extra sounds or murmurs. Peripheral pulses in arms and legs were normal.  Jugular venous pressure was normal.  There was no peripheral edema.  GASTROINTESTINAL: The abdomen was normal in contour.  Bowel sounds were present.  Percussion detected no organ enlargement or tenderness.  Palpation detected no tenderness, mass, or enlarged organs.   MUSCULOSKELETAL: Skeletal configuration was normal and muscle mass was normal for age. Joint appearance was overall normal.  LYMPHATIC: There were no enlarged nodes.  SKIN/HAIR/NAILS: Skin color was normal.  There were no skin lesions.  Hair and nails were normal.  NEUROLOGIC: The patient was alert and oriented to person, place, time, and circumstance. Speech was normal. Cranial nerves were normal. Motor strength was normal for age. The patient was normally coordinated.  PSYCHIATRIC:  Mood and affect were normal and the patient had normal recent and remote memory. The patient's judgment and insight were normal.    ADDITIONAL VITAL SIGNS: none  CHEST WALL/BREASTS: nml    RECTAL: def  GENITAL/URINARY: nml     Additional Information        Joseluis Chappell MD  Internal Medicine  Contact me at [phone]

## 2021-06-24 NOTE — TELEPHONE ENCOUNTER
----- Message from Joseluis Chappell MD sent at 2/26/2019  5:28 PM CST -----  Please call and send results:    PSA is undetectable again.  Cholesterol looks better than last year.  You are right on the border whether or not we should start a medication this year.  Considering that you plan to increase your exercise and work harder on diet and weight loss, I think that we can hold off for another year on the medication.  Your potassium is a little low.  I want you to increase the potassium in your diet.  You should have this repeated in a week or 2.  You can do that in [place].  Liver and kidney functions otherwise look fine.  Urine is fine.  I do not think the small amount of protein is significant.    Shira please send potassium information to him and place order for repeat potassium.  Diagnosis is hypokalemia.

## 2021-06-25 NOTE — TELEPHONE ENCOUNTER
Refill Approved    Rx renewed per Medication Renewal Policy. Medication was last renewed on 2/1/21.    Gerhard Solorzano, [facility] Connection Triage/Med Refill 6/2/2021     Requested Prescriptions   Pending Prescriptions Disp Refills     losartan (COZAAR) 50 MG tablet [Pharmacy Med Name: LOSARTAN POTASSIUM 50MG TABS] 180 tablet 0     Sig: TAKE TWO TABLETS BY MOUTH EVERY DAY       Angiotensin Receptor Blocker Protocol Passed - 6/1/2021 10:59 AM        Passed - PCP or prescribing provider visit in past 12 months       Last office visit with prescriber/PCP: Visit date not found OR same dept: Visit date not found OR same specialty: Visit date not found  Last physical: 2/2/2021 Last MTM visit: Visit date not found   Next visit within 3 mo: Visit date not found  Next physical within 3 mo: Visit date not found  Prescriber OR PCP: Joseluis Chappell MD  Last diagnosis associated with med order: 1. Benign Essential Hypertension  - losartan (COZAAR) 50 MG tablet [Pharmacy Med Name: LOSARTAN POTASSIUM 50MG TABS]; TAKE TWO TABLETS BY MOUTH EVERY DAY  Dispense: 180 tablet; Refill: 0  - hydroCHLOROthiazide (HYDRODIURIL) 12.5 MG tablet [Pharmacy Med Name: HYDROCHLOROTHIAZIDE 12.5MG TABS]; TAKE ONE TABLET BY MOUTH EVERY DAY  Dispense: 90 tablet; Refill: 0    If protocol passes may refill for 12 months if within 3 months of last provider visit (or a total of 15 months).             Passed - Serum potassium within the past 12 months     Lab Results   Component Value Date    Potassium 3.5 02/02/2021             Passed - Blood pressure filed in past 12 months     BP Readings from Last 1 Encounters:   02/02/21 130/82             Passed - Serum creatinine within the past 12 months     Creatinine   Date Value Ref Range Status   02/02/2021 0.93 0.70 - 1.30 mg/dL Final                hydroCHLOROthiazide (HYDRODIURIL) 12.5 MG tablet [Pharmacy Med Name: HYDROCHLOROTHIAZIDE 12.5MG TABS] 90 tablet 0     Sig: TAKE ONE TABLET BY MOUTH EVERY DAY        Diuretics/Combination Diuretics Refill Protocol  Passed - 6/1/2021 10:59 AM        Passed - Visit with PCP or prescribing provider visit in past 12 months     Last office visit with prescriber/PCP: Visit date not found OR same dept: Visit date not found OR same specialty: Visit date not found  Last physical: 2/2/2021 Last MTM visit: Visit date not found   Next visit within 3 mo: Visit date not found  Next physical within 3 mo: Visit date not found  Prescriber OR PCP: Joseluis Chappell MD  Last diagnosis associated with med order: 1. Benign Essential Hypertension  - losartan (COZAAR) 50 MG tablet [Pharmacy Med Name: LOSARTAN POTASSIUM 50MG TABS]; TAKE TWO TABLETS BY MOUTH EVERY DAY  Dispense: 180 tablet; Refill: 0  - hydroCHLOROthiazide (HYDRODIURIL) 12.5 MG tablet [Pharmacy Med Name: HYDROCHLOROTHIAZIDE 12.5MG TABS]; TAKE ONE TABLET BY MOUTH EVERY DAY  Dispense: 90 tablet; Refill: 0    If protocol passes may refill for 12 months if within 3 months of last provider visit (or a total of 15 months).             Passed - Serum Potassium in past 12 months      Lab Results   Component Value Date    Potassium 3.5 02/02/2021             Passed - Serum Sodium in past 12 months      Lab Results   Component Value Date    Sodium 140 02/02/2021             Passed - Blood pressure on file in past 12 months     BP Readings from Last 1 Encounters:   02/02/21 130/82             Passed - Serum Creatinine in past 12 months      Creatinine   Date Value Ref Range Status   02/02/2021 0.93 0.70 - 1.30 mg/dL Final

## 2021-06-25 NOTE — TELEPHONE ENCOUNTER
Sleep Order Update:    [facility] has left multiple messages for patient. A contact letter has also been sent to patient's home. No response from patient. We will defer this order at this time in case patient wants to schedule in the future.     No additional action is currently needed.    Thank you

## 2022-02-05 DIAGNOSIS — I10 ESSENTIAL HYPERTENSION, BENIGN: Primary | ICD-10-CM

## 2022-02-07 RX ORDER — LOSARTAN POTASSIUM AND HYDROCHLOROTHIAZIDE 12.5; 5 MG/1; MG/1
1 TABLET ORAL DAILY
Qty: 90 TABLET | Refills: 2 | OUTPATIENT
Start: 2022-02-07

## 2022-02-07 RX ORDER — HYDROCHLOROTHIAZIDE 12.5 MG/1
TABLET ORAL
Qty: 90 TABLET | Refills: 2 | OUTPATIENT
Start: 2022-02-07

## 2022-02-07 RX ORDER — LOSARTAN POTASSIUM AND HYDROCHLOROTHIAZIDE 12.5; 1 MG/1; MG/1
1 TABLET ORAL DAILY
Qty: 90 TABLET | Refills: 1 | Status: SHIPPED | OUTPATIENT
Start: 2022-02-07 | End: 2022-02-08

## 2022-02-07 RX ORDER — LOSARTAN POTASSIUM 50 MG/1
TABLET ORAL
Qty: 180 TABLET | Refills: 2 | OUTPATIENT
Start: 2022-02-07

## 2022-02-07 NOTE — TELEPHONE ENCOUNTER
Please contact patient and see if he would like to switch to the combination losartan hydrochlorothiazide or at least switch to 100 mg tablet of losartan.  Thank you

## 2022-02-07 NOTE — TELEPHONE ENCOUNTER
Patient called and notified information below, he would like to convert visit to one pill. Rx pended for approval.

## 2022-02-07 NOTE — TELEPHONE ENCOUNTER
"Routing refill request to provider for review/approval because:  Labs not current:  Multiple  Patient needs to be seen because it has been more than 1 year since last office visit.    Last Written Prescription Date:  6/2/21  Last Fill Quantity: 90,  # refills: 2   Last office visit provider:  2/2/21         Last Written Prescription Date:  6/2/21  Last Fill Quantity: 180,  # refills: 2       Requested Prescriptions   Pending Prescriptions Disp Refills     hydrochlorothiazide (HYDRODIURIL) 12.5 MG tablet [Pharmacy Med Name: HYDROCHLOROTHIAZIDE 12.5MG TABS] 90 tablet 2     Sig: TAKE ONE TABLET BY MOUTH EVERY DAY       Diuretics (Including Combos) Protocol Failed - 2/5/2022 11:38 AM        Failed - Blood pressure under 140/90 in past 12 months     BP Readings from Last 3 Encounters:   02/02/21 130/82   02/26/20 122/66   05/20/05 140/110                 Failed - Medication is active on med list        Failed - Normal serum creatinine on file in past 12 months     Recent Labs   Lab Test 02/02/21  1233   CR 0.93              Failed - Normal serum potassium on file in past 12 months     Recent Labs   Lab Test 02/02/21  1233   POTASSIUM 3.5                    Failed - Normal serum sodium on file in past 12 months     Recent Labs   Lab Test 02/02/21  1233                 Passed - Recent (12 mo) or future (30 days) visit within the authorizing provider's specialty     Patient has had an office visit with the authorizing provider or a provider within the authorizing providers department within the previous 12 mos or has a future within next 30 days. See \"Patient Info\" tab in inbasket, or \"Choose Columns\" in Meds & Orders section of the refill encounter.              Passed - Patient is age 18 or older           losartan (COZAAR) 50 MG tablet [Pharmacy Med Name: LOSARTAN POTASSIUM 50MG TABS] 180 tablet 2     Sig: TAKE TWO TABLETS BY MOUTH EVERY DAY       Angiotensin-II Receptors Failed - 2/5/2022 11:38 AM        Failed - " "Last blood pressure under 140/90 in past 12 months     BP Readings from Last 3 Encounters:   02/02/21 130/82   02/26/20 122/66   05/20/05 140/110                 Failed - Medication is active on med list        Failed - Normal serum creatinine on file in past 12 months     Recent Labs   Lab Test 02/02/21  1233   CR 0.93       Ok to refill medication if creatinine is low          Failed - Normal serum potassium on file in past 12 months     Recent Labs   Lab Test 02/02/21  1233   POTASSIUM 3.5                    Passed - Recent (12 mo) or future (30 days) visit within the authorizing provider's specialty     Patient has had an office visit with the authorizing provider or a provider within the authorizing providers department within the previous 12 mos or has a future within next 30 days. See \"Patient Info\" tab in inbasket, or \"Choose Columns\" in Meds & Orders section of the refill encounter.              Passed - Patient is age 18 or older             Fozia Carreno RN 02/07/22 12:24 PM  "

## 2022-02-08 ENCOUNTER — TELEPHONE (OUTPATIENT)
Dept: INTERNAL MEDICINE | Facility: CLINIC | Age: 62
End: 2022-02-08
Payer: COMMERCIAL

## 2022-02-08 DIAGNOSIS — I10 ESSENTIAL HYPERTENSION, BENIGN: Primary | ICD-10-CM

## 2022-02-08 RX ORDER — VALSARTAN AND HYDROCHLOROTHIAZIDE 160; 12.5 MG/1; MG/1
1 TABLET, FILM COATED ORAL DAILY
Qty: 90 TABLET | Refills: 1 | Status: SHIPPED | OUTPATIENT
Start: 2022-02-08 | End: 2022-08-17

## 2022-02-08 NOTE — TELEPHONE ENCOUNTER
Reason for Call:  Other call back    Detailed comments: Pharmacy calling stating they are unable to get Losartan - it is on back order  Requesting an alternative medication        Phone Number Patient can be reached at: Other phone number:  [phone]    Best Time: anytime    Can we leave a detailed message on this number? YES    Call taken on 2/8/2022 at 10:41 AM by Zhanna Beckman

## 2022-02-08 NOTE — TELEPHONE ENCOUNTER
Consider switching pharmacy, or prescribe alternative to losartan-hydrochlorothiazide 100/12.5 mg such as lisinopril 20/12.5mg.  I did speak with the pharmacy and they are not able to get any losartan in a different brand.  This is the first I have heard about this backorder.      Equivalent dosing of alternate ARBs/ACEI.   losartan  25mg 50mg 100mg 100mg    valsartan  40mg  80mg 160mg 320mg    candesartan 4mg 8mg 16mg  32mg    lisinopril 5mg 10mg 20mg 40mg

## 2022-02-08 NOTE — TELEPHONE ENCOUNTER
Please call patient to let him know that we had to switch his medication to valsartan hydrochlorothiazide.  Very similar medication.  Have him take 1 daily until I see him.  I believe he has an appointment soon.

## 2022-02-08 NOTE — TELEPHONE ENCOUNTER
Attempted to contact patient to relay MD's note below.   Left voicemail requesting call back to clinic.

## 2022-02-09 NOTE — TELEPHONE ENCOUNTER
Pt called back, relayed message, he understood. States he may not be able to come see Dr. Chappell anymore due to insurance

## 2022-02-10 RX ORDER — LOSARTAN POTASSIUM 50 MG/1
TABLET ORAL
Qty: 180 TABLET | Refills: 2 | OUTPATIENT
Start: 2022-02-10

## 2022-02-10 RX ORDER — HYDROCHLOROTHIAZIDE 12.5 MG/1
TABLET ORAL
Qty: 90 TABLET | Refills: 2 | OUTPATIENT
Start: 2022-02-10

## 2022-02-10 NOTE — TELEPHONE ENCOUNTER
Please make sure patient is aware that I sent a different medication to his pharmacy to take the place of these 2 medications because his pharmacy did not have this medication.  Pharmacy should fill his valsartan hydrochlorothiazide prescription.

## 2022-02-10 NOTE — TELEPHONE ENCOUNTER
Outpatient Medication Detail     Disp Refills Start End LIZ   hydroCHLOROthiazide (HYDRODIURIL) 12.5 MG tablet 90 tablet 2 6/2/2021  No   Sig: TAKE ONE TABLET BY MOUTH EVERY DAY   Sent to pharmacy as: hydroCHLOROthiazide 12.5 mg tablet (HYDRODIURIL)   E-Prescribing Status: Receipt confirmed by pharmacy (6/2/2021 12:58 PM CDT)       hydroCHLOROthiazide (HYDRODIURIL) 12.5 MG tablet [[id number]]    Electronically signed by: Gerhard Solorzano RN on 06/02/21 1258 Status: Active   Ordering user: Gerhard Solorzano RN 06/02/21 1258 Ordering provider: Joseluis Chappell MD   Authorized by: Joseluis Chappell MD   Frequency:  06/02/21 - Until Discontinued Released by: Gerhard Solorzano RN 06/02/21 1258   Diagnoses  Essential hypertension, benign [I10]   Outpatient Medication Detail     Disp Refills Start End LIZ   losartan (COZAAR) 50 MG tablet 180 tablet 2 6/2/2021  No   Sig: TAKE TWO TABLETS BY MOUTH EVERY DAY   Sent to pharmacy as: losartan 50 mg tablet (COZAAR)   E-Prescribing Status: Receipt confirmed by pharmacy (6/2/2021 12:58 PM CDT)       losartan (COZAAR) 50 MG tablet [[id number]]    Electronically signed by: Gerhard Solorzano RN on 06/02/21 1258 Status: Active   Ordering user: Gerhard Solorzano RN 06/02/21 1258 Ordering provider: Joseluis Chappell MD   Authorized by: Joseluis Chappell MD   Frequency:  06/02/21 - Until Discontinued Released by: Gerhard Solorzano RN 06/02/21 1258   Diagnoses  Essential hypertension, benign [I10]       Routing refill request to provider for review/approval because:  Labs not current:  Multiple  Patient needs to be seen because it has been more than 1 year since last office visit.  BP not current    Last office visit provider:  2/2/21     Requested Prescriptions   Pending Prescriptions Disp Refills     hydrochlorothiazide (HYDRODIURIL) 12.5 MG tablet [Pharmacy Med Name: HYDROCHLOROTHIAZIDE 12.5MG TABS] 90 tablet 2     Sig: TAKE ONE TABLET BY MOUTH EVERY DAY       Diuretics (Including Combos) Protocol Failed - 2/8/2022   "1:38 PM        Failed - Blood pressure under 140/90 in past 12 months     BP Readings from Last 3 Encounters:   02/02/21 130/82   02/26/20 122/66   05/20/05 140/110                 Failed - Recent (12 mo) or future (30 days) visit within the authorizing provider's specialty     Patient has had an office visit with the authorizing provider or a provider within the authorizing providers department within the previous 12 mos or has a future within next 30 days. See \"Patient Info\" tab in inevocatalsket, or \"Choose Columns\" in Meds & Orders section of the refill encounter.              Failed - Medication is active on med list        Failed - Normal serum creatinine on file in past 12 months     Recent Labs   Lab Test 02/02/21  1233   CR 0.93              Failed - Normal serum potassium on file in past 12 months     Recent Labs   Lab Test 02/02/21  1233   POTASSIUM 3.5                    Failed - Normal serum sodium on file in past 12 months     Recent Labs   Lab Test 02/02/21  1233                 Passed - Patient is age 18 or older           losartan (COZAAR) 50 MG tablet [Pharmacy Med Name: LOSARTAN POTASSIUM 50MG TABS] 180 tablet 2     Sig: TAKE TWO TABLETS BY MOUTH EVERY DAY       Angiotensin-II Receptors Failed - 2/8/2022  1:38 PM        Failed - Last blood pressure under 140/90 in past 12 months     BP Readings from Last 3 Encounters:   02/02/21 130/82   02/26/20 122/66   05/20/05 140/110                 Failed - Recent (12 mo) or future (30 days) visit within the authorizing provider's specialty     Patient has had an office visit with the authorizing provider or a provider within the authorizing providers department within the previous 12 mos or has a future within next 30 days. See \"Patient Info\" tab in inevocatalsket, or \"Choose Columns\" in Meds & Orders section of the refill encounter.              Failed - Medication is active on med list        Failed - Normal serum creatinine on file in past 12 months     Recent " Labs   Lab Test 02/02/21  1233   CR 0.93       Ok to refill medication if creatinine is low          Failed - Normal serum potassium on file in past 12 months     Recent Labs   Lab Test 02/02/21  1233   POTASSIUM 3.5                    Passed - Patient is age 18 or older             Gerhard Solorzano RN 02/10/22 10:24 AM

## 2022-04-19 ENCOUNTER — OFFICE VISIT (OUTPATIENT)
Dept: INTERNAL MEDICINE | Facility: CLINIC | Age: 62
End: 2022-04-19
Payer: COMMERCIAL

## 2022-04-19 VITALS
BODY MASS INDEX: 29.62 KG/M2 | DIASTOLIC BLOOD PRESSURE: 82 MMHG | SYSTOLIC BLOOD PRESSURE: 138 MMHG | WEIGHT: 211.6 LBS | HEART RATE: 52 BPM | TEMPERATURE: 97.3 F | OXYGEN SATURATION: 98 % | RESPIRATION RATE: 18 BRPM | HEIGHT: 71 IN

## 2022-04-19 DIAGNOSIS — G47.33 OBSTRUCTIVE SLEEP APNEA: ICD-10-CM

## 2022-04-19 DIAGNOSIS — R35.0 URINARY FREQUENCY: ICD-10-CM

## 2022-04-19 DIAGNOSIS — E78.2 MIXED HYPERLIPIDEMIA: ICD-10-CM

## 2022-04-19 DIAGNOSIS — R73.09 OTHER ABNORMAL GLUCOSE: ICD-10-CM

## 2022-04-19 DIAGNOSIS — R32 URINARY INCONTINENCE, UNSPECIFIED TYPE: Primary | ICD-10-CM

## 2022-04-19 DIAGNOSIS — C61 MALIGNANT NEOPLASM OF PROSTATE (H): ICD-10-CM

## 2022-04-19 LAB
ALBUMIN UR-MCNC: NEGATIVE MG/DL
APPEARANCE UR: CLEAR
BILIRUB UR QL STRIP: NEGATIVE
COLOR UR AUTO: YELLOW
GLUCOSE UR STRIP-MCNC: NEGATIVE MG/DL
HGB UR QL STRIP: NEGATIVE
KETONES UR STRIP-MCNC: NEGATIVE MG/DL
LEUKOCYTE ESTERASE UR QL STRIP: NEGATIVE
NITRATE UR QL: NEGATIVE
PH UR STRIP: 5.5 [PH] (ref 5–8)
SP GR UR STRIP: 1.01 (ref 1–1.03)
UROBILINOGEN UR STRIP-ACNC: 0.2 E.U./DL

## 2022-04-19 PROCEDURE — 81003 URINALYSIS AUTO W/O SCOPE: CPT | Performed by: INTERNAL MEDICINE

## 2022-04-19 PROCEDURE — 99213 OFFICE O/P EST LOW 20 MIN: CPT | Performed by: INTERNAL MEDICINE

## 2022-04-19 NOTE — PROGRESS NOTES
Office Visit - Follow Up   Nathanael Walker   61 year old male    Date of Visit: 4/19/2022    Chief Complaint   Patient presents with     Urinary Problem     Urinary incontinence & frequency x 1 wk, no dysuria, no hematuria         Assessment and Plan   1. Urinary incontinence, unspecified type  Urinalysis today was normal.  I suspect this was due to to his alcohol intake.  I urged him against over imbibing.  For symptoms recur he should see urology and I gave him the name of Dr. Shorty Pinedo.  - UA Macro with Reflex to Micro and Culture - lab collect; Future  - UA Macro with Reflex to Micro and Culture - lab collect  - Comprehensive metabolic panel (BMP + Alb, Alk Phos, ALT, AST, Total. Bili, TP); Future    2. Urinary frequency  As above.  I will check renal function as well.  He will get this done at his [facility]  - Comprehensive metabolic panel (BMP + Alb, Alk Phos, ALT, AST, Total. Bili, TP); Future    3. Malignant neoplasm of prostate (H)    - PSA, tumor marker; Future    4. Other abnormal glucose  Will check average sugar given his incontinence  - Hemoglobin A1c; Future    5. Obstructive sleep apnea  He tells me he is compliant with the CPAP labs for monitoring  - CBC with platelets; Future    6. Mixed hyperlipidemia    - Lipid panel reflex to direct LDL Fasting; Future    I wished Abdi best of luck.  If he has an insurance change and would like to reestablish care next year or in the future I have invited him back at any time.    Return in about 1 year (around 4/19/2023) for Routine preventive, with me, in person.     History of Present Illness   This 61 year old red comes in today as an urgent add-on for evaluation of urinary incontinence.  He has a history of prostate cancer and has done well ever since.  However he was done at [facility] and had a lot of issues with urinary incontinence.  He was drinking heavier amounts of alcohol than normal.  Now that he is home he is not drinking alcohol  "and his urination is back to normal.  He denied any dysuria or fevers.  Otherwise been feeling well.  Of note he has had a insurance change and cannot get his labs done here.  He also would not be able to come see me as he needs to now see a [facility] physician.    Review of Systems: A comprehensive review of systems was negative except as noted.     Medications, Allergies and Problem List   Reviewed, reconciled and updated  Post Discharge Medication Reconciliation Status:   Social History     Social History Narrative     Not on file        Physical Exam   General Appearance:   Pleasant gentleman who looks well.  Further exam deferred    /82 (BP Location: Left arm, Patient Position: Sitting, Cuff Size: Adult Large)   Pulse 52   Temp 97.3  F (36.3  C) (Axillary)   Resp 18   Ht 1.803 m (5' 11\")   Wt 96 kg (211 lb 9.6 oz)   SpO2 98%   BMI 29.51 kg/m           Additional Information   Current Outpatient Medications   Medication Sig Dispense Refill     ASPIRIN 81 MG OR TABS 1 TABLET DAILY 30 0     valsartan-hydrochlorothiazide (DIOVAN HCT) 160-12.5 MG tablet Take 1 tablet by mouth daily 90 tablet 1     No Known Allergies  Social History     Tobacco Use     Smoking status: Never Smoker     Smokeless tobacco: Never Used   Substance Use Topics     Alcohol use: Yes     Alcohol/week: 1.7 - 2.5 standard drinks     Comment: 2 x wk     Drug use: No       Review and/or order of clinical lab tests:  Review and/or order of radiology tests:  Review and/or order of medicine tests:  Discussion of test results with performing physician:  Decision to obtain old records and/or obtain history from someone other than the patient:  Review and summarization of old records and/or obtaining history from someone other than the patient and.or discussion of case with another health care provider:  Independent visualization of image, tracing or specimen itself:    Time:      JAYCE BLACKBURN MD  "

## 2022-04-20 ENCOUNTER — TRANSFERRED RECORDS (OUTPATIENT)
Dept: HEALTH INFORMATION MANAGEMENT | Facility: CLINIC | Age: 62
End: 2022-04-20
Payer: COMMERCIAL

## 2022-04-20 LAB
ALT SERPL-CCNC: 39 U/L
AST SERPL-CCNC: 31 U/L (ref 17–59)
CHOLESTEROL (EXTERNAL): 214 MG/DL (ref 0–200)
CREATININE (EXTERNAL): 0.8 MG/DL (ref 0.7–1.4)
GLUCOSE (EXTERNAL): 97 MG/DL (ref 60–99)
HBA1C MFR BLD: 5.2 %
HDLC SERPL-MCNC: 45 MG/DL (ref 35–65)
LDL CHOLESTEROL (EXTERNAL): 129 MG/DL (ref 0–130)
POTASSIUM (EXTERNAL): 4.1 MEQ/L (ref 3.5–5.1)
TRIGLYCERIDES (EXTERNAL): 201 MG/DL (ref 0–200)

## 2022-08-16 ENCOUNTER — TELEPHONE (OUTPATIENT)
Dept: INTERNAL MEDICINE | Facility: CLINIC | Age: 62
End: 2022-08-16

## 2022-08-16 DIAGNOSIS — I25.10 ATHEROSCLEROSIS OF NATIVE CORONARY ARTERY OF NATIVE HEART WITHOUT ANGINA PECTORIS: Primary | ICD-10-CM

## 2022-08-16 NOTE — TELEPHONE ENCOUNTER
I do not see any CT scan either.  Need more information.  Unable to access care everywhere.  I received no results.  Also, did he get the blood work that I had wanted to get done a while back?

## 2022-08-16 NOTE — TELEPHONE ENCOUNTER
Test Results    Contacts       Type Contact Phone/Fax    08/16/2022 03:30 PM CDT Phone (Incoming) Abdi Walker (Self) [phone] (M)            Type of test: CT Scan      What are your questions/concerns?:  Patient is requesting for call back from provider or care team. Patient has question about his CT Scan. I do not see any recent Ct Scan. Please advised.     Okay to leave a detailed message?: Yes at Cell number on file:    Telephone Information:   Mobile [phone]

## 2022-08-17 NOTE — TELEPHONE ENCOUNTER
Left detailed message to return call to clinic. Please provide more information/details on this. There are no recent viewable CT records in patient's chart.

## 2022-08-18 RX ORDER — ROSUVASTATIN CALCIUM 40 MG/1
40 TABLET, COATED ORAL DAILY
Qty: 90 TABLET | Refills: 3 | Status: SHIPPED | OUTPATIENT
Start: 2022-08-18

## 2022-08-18 NOTE — TELEPHONE ENCOUNTER
I spoke with Abid today.  I was able to retrieve his CT calcium score which was 472.  Pretty high numbers.  He has been resistant to taking statins in the past and after long discussion he is willing to start statin today.  Begin high intensity statin Crestor 40 mg daily.  He will remain on his low-dose aspirin.  Side effects were discussed with patient.  We discussed cardiology consultation as well.  He wants to hold off on that for now until he transfers back to a different insurance.  Currently he would see a [facility] cardiologist and wants to hold off on that.  He is extremely active with no angina or cardiac symptoms whatsoever.  I think that as long as he is not having any symptoms holding off on cardiology consultation is fine.  Again I did discuss with him that eating healthy with a Mediterranean diet and taking the high intensity statin is imperative.    He also had lab work done earlier this year apparently but I never received those results.  We will try to track those down.

## 2022-08-22 ENCOUNTER — TELEPHONE (OUTPATIENT)
Dept: INTERNAL MEDICINE | Facility: CLINIC | Age: 62
End: 2022-08-22

## 2022-08-22 NOTE — TELEPHONE ENCOUNTER
General Call      Reason for Call:  Pt calling having questions regarding Crestor and also had a CT scan of heart - has questions regarding results    What are your questions or concerns:  n/a    Date of last appointment with provider: n/a    Okay to leave a detailed message?: Yes at Cell number on file:    Telephone Information:   Mobile [phone]

## 2022-08-25 NOTE — TELEPHONE ENCOUNTER
Pt states that he started the Crestor on Sunday and started to deveolp leg and joint pain   Pt would like to wait until Dr Chappell and advise and also review CT results in Media tab     Pt will call back if anything changes

## 2022-09-06 NOTE — TELEPHONE ENCOUNTER
Left message for [facility] medical records to fax report. Will keep message until results are received

## 2022-09-07 NOTE — TELEPHONE ENCOUNTER
Please call patient and let him know I still needed some additional test that he apparently never got done.  These are from April 29.

## 2022-09-07 NOTE — TELEPHONE ENCOUNTER
I spoke with patient.  He seems to be tolerating the medication without difficulty I urged him to continue to take it.

## 2022-09-08 NOTE — TELEPHONE ENCOUNTER
I have connected with regarding getting missing labs completed. I have reprint future lab orders placed from 4/29/22 and will mail out to patient. He will plan on getting labs done closer to home. Advise patient to have their lab dept fax results to clinic [phone].    Patient also had a questions pertaining to Crestor. Patient indicates that he did discuss with provider about eating a healthy  Mediterranean diet and taking the high intensity statin.     His questions is if doing what is recommended, will this help reduce his Calcium score level? When or will he need to get another scan done? Please advise.

## 2022-09-08 NOTE — TELEPHONE ENCOUNTER
I called and left him a message addressing questions below.  Let him know I would try him back again next week so we can talk further.

## 2022-09-15 NOTE — TELEPHONE ENCOUNTER
I spoke with patient.  He is tolerating the Crestor nicely.  He feels fine.  He wants to increase his exercise.  Given the high calcium score I think that we should do stress testing prior to him engaging in a more strenuous exercise regimen.  We will have him undergo stress Cardiolite for further evaluation.  His insurance requires this to be done at a [facility] facility so we will try to facilitate that.

## 2022-10-07 ENCOUNTER — TRANSFERRED RECORDS (OUTPATIENT)
Dept: HEALTH INFORMATION MANAGEMENT | Facility: CLINIC | Age: 62
End: 2022-10-07

## 2022-10-17 ENCOUNTER — TRANSFERRED RECORDS (OUTPATIENT)
Dept: HEALTH INFORMATION MANAGEMENT | Facility: CLINIC | Age: 62
End: 2022-10-17

## 2022-10-24 ENCOUNTER — TELEPHONE (OUTPATIENT)
Dept: INTERNAL MEDICINE | Facility: CLINIC | Age: 62
End: 2022-10-24

## 2022-10-24 NOTE — TELEPHONE ENCOUNTER
General Call      Reason for Call:  Pt had some lab and cardiology(10/17/2022 at [facility]) testings done at [facility] on 10/10/2022  Pt is having these testings faxed over for review by PCP and would like a call back to discuss    Please advise    What are your questions or concerns:  n/a    Date of last appointment with provider: n/a    Okay to leave a detailed message?: Yes at Cell number on file:    Telephone Information:   Mobile [phone]

## 2022-10-26 NOTE — TELEPHONE ENCOUNTER
Please call pt:  Thank you for letting me know that you got these done.  They unfortunately did not send the results to me.  Good news Abdi.  Your repeat calcium level, as well as calcium regulating hormone and vitamin D levels are all normal.  No further testing needed.

## 2022-10-27 ENCOUNTER — TELEPHONE (OUTPATIENT)
Dept: INTERNAL MEDICINE | Facility: CLINIC | Age: 62
End: 2022-10-27

## 2022-10-27 DIAGNOSIS — I25.10 ATHEROSCLEROSIS OF NATIVE CORONARY ARTERY OF NATIVE HEART WITHOUT ANGINA PECTORIS: Primary | ICD-10-CM

## 2022-10-27 NOTE — TELEPHONE ENCOUNTER
Attempted to contact patient to relay information below from Dr Chappell. No answer. Left message to call clinic.

## 2023-01-23 ENCOUNTER — TELEPHONE (OUTPATIENT)
Dept: INTERNAL MEDICINE | Facility: CLINIC | Age: 63
End: 2023-01-23
Payer: COMMERCIAL

## 2023-01-23 NOTE — TELEPHONE ENCOUNTER
----- Message from Joseluis Chappell MD sent at 1/23/2023 12:33 PM CST -----  Regarding: RE: Physician Recommendation  I don't know any  physicians in primary care in the [facility].  [facility].    ----- Message -----  From: Do Pacheco RN  Sent: 1/23/2023  10:34 AM CST  To: Joseluis Chappell MD  Subject: Physician Recommendation                         Dr. Chappell,     While speaking with patient regarding his cardiology referral today, he requested to ask you if you knew of any [facility] primary care physicians ([facility]) in the [facility]? Pt stated that he has been seeing you for a long time and trusts your opinion however his insurance does not cover visits with you anymore.     Thank you

## 2023-01-23 NOTE — TELEPHONE ENCOUNTER
Attempted to call pt, no answer (1/3). Left message requesting pt to call back clinic. See recent PCP note for this encounter when pt calls back clinic.

## 2023-01-24 NOTE — TELEPHONE ENCOUNTER
Attempted to call pt, no answer (2/3). Left message requesting pt to call back clinic. See recent PCP note for this encounter.

## 2023-01-26 NOTE — TELEPHONE ENCOUNTER
Attempted to contact patient to relay information below from Dr Chappell. No answer. Left generic message that Dr Chappell does not know any [facility] providers in his area. Instructed to contact the clinic with further needs.

## 2023-06-20 NOTE — TELEPHONE ENCOUNTER
I called Abdi zoyajustin and left a message.  I reviewed his nuclear stress test.  He went 11 minutes on a Alejandro.  No symptoms.  He did have abnormal ST response to exercise and a 4 beat run of ventricular tachycardia.  No ischemia or infarct.  Normal myocardial perfusion imaging.  I think that with the run of VT we should probably have him meet with a cardiologist.  I will place that order for the [facility] cardiology group as that is his insurer.   Body Location Override (Optional - Billing Will Still Be Based On Selected Body Map Location If Applicable): right lateral temple Detail Level: Detailed Size Of Lesion: 2.1 Anesthesia Type: 2% lidocaine with epinephrine and a 1:10 solution of sodium bicarbonate and clindamycin Hemostasis: Electrodesiccation Estimated Blood Loss (Cc): minimal Bill For Surgical Tray: no Number Of Stages: 1 Stage 1: Anesthesia Volume In Cc: 2 Stage 1: Depth Of Layer: dermis Stage 1: Defect X-Dimension: 2.3 Stage 1: Defect Y-Dimension: 1.7 Stage 2: Number Of Sections (Blocks) ?: 0 Repair Type: Complex Repair Simple / Intermediate / Complex Repair - Final Wound Length In Cm: 4.9 Complex Requirements: Extensive Undermining Performed?: Yes Undermining Type: Entire Wound Debridement Text: The wound edges were debrided prior to proceeding with the closure to facilitate wound healing. Helical Rim Text: The closure involved the helical rim. Vermilion Border Text: The closure involved the vermilion border. Nostril Rim Text: The closure involved the nostril rim. Retention Suture Text: Retention sutures were placed to support the closure and prevent dehiscence. Deep Sutures: 5-0 Monocryl Epidermal Closure: simple interrupted Suture Removal: 7 days Wound Care: No ointment Consent: The rationale for Mohs was explained to the patient and consent was obtained. The risks, benefits and alternatives to therapy were discussed in detail. Specifically, the risks of infection, scarring, bleeding, prolonged wound healing, incomplete removal, allergy to anesthesia, nerve injury and recurrence were addressed. Prior to the procedure, the treatment site was clearly identified and confirmed by the patient. All components of Universal Protocol/PAUSE Rule completed. Post-Care Instructions: I reviewed with the patient in detail post-care instructions. Leave brown tape in place.  Patient is not to engage in any heavy lifting, exercise, or swimming for the next 14 days. Should the patient develop any fevers, chills, bleeding, severe pain patient will contact the office immediately.

## 2023-09-07 DIAGNOSIS — I10 ESSENTIAL HYPERTENSION, BENIGN: ICD-10-CM

## 2023-09-07 RX ORDER — VALSARTAN AND HYDROCHLOROTHIAZIDE 160; 12.5 MG/1; MG/1
1 TABLET, FILM COATED ORAL DAILY
Qty: 90 TABLET | Refills: 3 | OUTPATIENT
Start: 2023-09-07

## 2023-09-07 NOTE — TELEPHONE ENCOUNTER
"Routing refill request to provider for review/approval because:  Labs not current:  BP, CR, K, Na  Patient needs to be seen because it has been more than 1 year since last office visit.    Last Written Prescription Date:  8/17/22  Last Fill Quantity: 90,  # refills: 3   Last office visit provider:   4/19/22    Requested Prescriptions   Pending Prescriptions Disp Refills    valsartan-hydrochlorothiazide (DIOVAN HCT) 160-12.5 MG tablet 90 tablet 3     Sig: Take 1 tablet by mouth daily       Angiotensin-II Receptors Failed - 9/7/2023 12:47 PM        Failed - Last blood pressure under 140/90 in past 12 months     BP Readings from Last 3 Encounters:   04/19/22 138/82   02/02/21 130/82   02/26/20 122/66                 Failed - Recent (12 mo) or future (30 days) visit within the authorizing provider's specialty     Patient has had an office visit with the authorizing provider or a provider within the authorizing providers department within the previous 12 mos or has a future within next 30 days. See \"Patient Info\" tab in inbasket, or \"Choose Columns\" in Meds & Orders section of the refill encounter.              Failed - Normal serum creatinine on file in past 12 months     Recent Labs   Lab Test 02/02/21  1233   CR 0.93       Ok to refill medication if creatinine is low          Failed - Normal serum potassium on file in past 12 months     Recent Labs   Lab Test 02/02/21  1233   POTASSIUM 3.5                    Passed - Medication is active on med list        Passed - Patient is age 18 or older       Diuretics (Including Combos) Protocol Failed - 9/7/2023 12:47 PM        Failed - Blood pressure under 140/90 in past 12 months     BP Readings from Last 3 Encounters:   04/19/22 138/82   02/02/21 130/82   02/26/20 122/66                 Failed - Recent (12 mo) or future (30 days) visit within the authorizing provider's specialty     Patient has had an office visit with the authorizing provider or a provider within the " "authorizing providers department within the previous 12 mos or has a future within next 30 days. See \"Patient Info\" tab in inbasket, or \"Choose Columns\" in Meds & Orders section of the refill encounter.              Failed - Normal serum creatinine on file in past 12 months     Recent Labs   Lab Test 02/02/21  1233   CR 0.93              Failed - Normal serum potassium on file in past 12 months     Recent Labs   Lab Test 02/02/21  1233   POTASSIUM 3.5                    Failed - Normal serum sodium on file in past 12 months     Recent Labs   Lab Test 02/02/21  1233                 Passed - Medication is active on med list        Passed - Patient is age 18 or older             Lorna Herndon RN 09/07/23 3:51 PM  "

## 2023-09-07 NOTE — TELEPHONE ENCOUNTER
Please clarify if patient has transferred care to a new provider.  A note earlier this year stated he was changing to a [facility] provider.  If he has not transferred care, he needs to come see me.

## 2023-09-08 NOTE — TELEPHONE ENCOUNTER
Lm on vm to call back and clarify if he has established care elsewhere, otherwise to schedule an appt to see Dr Chappell.

## 2023-12-14 DIAGNOSIS — I10 ESSENTIAL HYPERTENSION, BENIGN: ICD-10-CM

## 2023-12-14 RX ORDER — VALSARTAN AND HYDROCHLOROTHIAZIDE 160; 12.5 MG/1; MG/1
1 TABLET, FILM COATED ORAL DAILY
Qty: 90 TABLET | Refills: 3 | OUTPATIENT
Start: 2023-12-14